# Patient Record
Sex: MALE | Race: WHITE | Employment: OTHER | URBAN - METROPOLITAN AREA
[De-identification: names, ages, dates, MRNs, and addresses within clinical notes are randomized per-mention and may not be internally consistent; named-entity substitution may affect disease eponyms.]

---

## 2017-01-19 ENCOUNTER — HOSPITAL ENCOUNTER (OUTPATIENT)
Dept: ENDOSCOPY | Age: 77
Discharge: HOME | End: 2017-01-19
Attending: INTERNAL MEDICINE | Admitting: INTERNAL MEDICINE

## 2017-01-19 VITALS
BODY MASS INDEX: 36.41 KG/M2 | WEIGHT: 232 LBS | SYSTOLIC BLOOD PRESSURE: 128 MMHG | HEART RATE: 64 BPM | TEMPERATURE: 97.3 F | HEIGHT: 67 IN | DIASTOLIC BLOOD PRESSURE: 88 MMHG | OXYGEN SATURATION: 97 % | RESPIRATION RATE: 16 BRPM

## 2017-01-19 LAB
GLUCOSE BLD-MCNC: 132 MG/DL (ref 70–99)
PERFORMED ON: ABNORMAL

## 2017-01-19 RX ORDER — MORPHINE SULFATE 2 MG/ML
2 INJECTION, SOLUTION INTRAMUSCULAR; INTRAVENOUS EVERY 5 MIN PRN
Status: DISCONTINUED | OUTPATIENT
Start: 2017-01-19 | End: 2017-01-20 | Stop reason: HOSPADM

## 2017-01-19 RX ORDER — OXYCODONE HYDROCHLORIDE AND ACETAMINOPHEN 5; 325 MG/1; MG/1
1 TABLET ORAL PRN
Status: ACTIVE | OUTPATIENT
Start: 2017-01-19 | End: 2017-01-19

## 2017-01-19 RX ORDER — PROMETHAZINE HYDROCHLORIDE 25 MG/ML
6.25 INJECTION, SOLUTION INTRAMUSCULAR; INTRAVENOUS
Status: DISCONTINUED | OUTPATIENT
Start: 2017-01-19 | End: 2017-01-20 | Stop reason: HOSPADM

## 2017-01-19 RX ORDER — LABETALOL HYDROCHLORIDE 5 MG/ML
5 INJECTION, SOLUTION INTRAVENOUS EVERY 10 MIN PRN
Status: DISCONTINUED | OUTPATIENT
Start: 2017-01-19 | End: 2017-01-20 | Stop reason: HOSPADM

## 2017-01-19 RX ORDER — ONDANSETRON 2 MG/ML
4 INJECTION INTRAMUSCULAR; INTRAVENOUS PRN
Status: DISCONTINUED | OUTPATIENT
Start: 2017-01-19 | End: 2017-01-20 | Stop reason: HOSPADM

## 2017-01-19 RX ORDER — DIPHENHYDRAMINE HYDROCHLORIDE 50 MG/ML
12.5 INJECTION INTRAMUSCULAR; INTRAVENOUS
Status: ACTIVE | OUTPATIENT
Start: 2017-01-19 | End: 2017-01-19

## 2017-01-19 RX ORDER — MEPERIDINE HYDROCHLORIDE 25 MG/ML
12.5 INJECTION INTRAMUSCULAR; INTRAVENOUS; SUBCUTANEOUS EVERY 5 MIN PRN
Status: DISCONTINUED | OUTPATIENT
Start: 2017-01-19 | End: 2017-01-20 | Stop reason: HOSPADM

## 2017-01-19 RX ORDER — HYDRALAZINE HYDROCHLORIDE 20 MG/ML
5 INJECTION INTRAMUSCULAR; INTRAVENOUS EVERY 10 MIN PRN
Status: DISCONTINUED | OUTPATIENT
Start: 2017-01-19 | End: 2017-01-20 | Stop reason: HOSPADM

## 2017-01-19 RX ORDER — SODIUM CHLORIDE 0.9 % (FLUSH) 0.9 %
10 SYRINGE (ML) INJECTION PRN
Status: DISCONTINUED | OUTPATIENT
Start: 2017-01-19 | End: 2017-01-20 | Stop reason: HOSPADM

## 2017-01-19 RX ORDER — SODIUM CHLORIDE 0.9 % (FLUSH) 0.9 %
10 SYRINGE (ML) INJECTION EVERY 12 HOURS SCHEDULED
Status: DISCONTINUED | OUTPATIENT
Start: 2017-01-19 | End: 2017-01-20 | Stop reason: HOSPADM

## 2017-01-19 RX ORDER — OXYCODONE HYDROCHLORIDE AND ACETAMINOPHEN 5; 325 MG/1; MG/1
2 TABLET ORAL PRN
Status: ACTIVE | OUTPATIENT
Start: 2017-01-19 | End: 2017-01-19

## 2017-01-19 RX ORDER — SODIUM CHLORIDE 9 MG/ML
INJECTION, SOLUTION INTRAVENOUS CONTINUOUS
Status: DISCONTINUED | OUTPATIENT
Start: 2017-01-19 | End: 2017-01-20 | Stop reason: HOSPADM

## 2017-01-19 RX ORDER — MORPHINE SULFATE 2 MG/ML
1 INJECTION, SOLUTION INTRAMUSCULAR; INTRAVENOUS EVERY 5 MIN PRN
Status: DISCONTINUED | OUTPATIENT
Start: 2017-01-19 | End: 2017-01-20 | Stop reason: HOSPADM

## 2017-01-19 RX ADMIN — SODIUM CHLORIDE: 9 INJECTION, SOLUTION INTRAVENOUS at 08:00

## 2017-01-19 ASSESSMENT — PAIN SCALES - GENERAL
PAINLEVEL_OUTOF10: 0
PAINLEVEL_OUTOF10: 0

## 2017-01-19 ASSESSMENT — PAIN - FUNCTIONAL ASSESSMENT
PAIN_FUNCTIONAL_ASSESSMENT: 0-10
PAIN_FUNCTIONAL_ASSESSMENT: FACES

## 2019-02-25 PROBLEM — R80.0 ISOLATED PROTEINURIA: Status: ACTIVE | Noted: 2019-02-25

## 2019-06-10 PROBLEM — N17.9 AKI (ACUTE KIDNEY INJURY) (HCC): Status: ACTIVE | Noted: 2019-06-10

## 2021-02-08 RX ORDER — VALSARTAN 80 MG/1
80 TABLET ORAL DAILY
COMMUNITY
Start: 2020-10-23 | End: 2021-02-08

## 2021-02-08 NOTE — PROGRESS NOTES
[facility] time______2/15/21 1100______        Surgery time___1200_________    Take the following medications with a sip of water:    Do not eat or drink anything after 12:00 midnight prior to your surgery. This includes water chewing gum, mints and ice chips. You may brush your teeth and gargle the morning of your surgery, but do not swallow the water     Please see your family doctor/pediatrician for a history and physical and/or concerning medications. Bring any test results/reports from your physicians office. If you are under the care of a heart doctor or specialist doctor, please be aware that you may be asked to them for clearance    You may be asked to stop blood thinners such as Coumadin, Plavix, Fragmin, Lovenox, etc., or any anti-inflammatories such as:  Aspirin, Ibuprofen, Advil, Naproxen prior to your surgery. We also ask that you stop any OTC medications such as fish oil, vitamin E, glucosamine, garlic, Multivitamins, COQ 10, etc.    We ask that you do not smoke 24 hours prior to surgery  We ask that you do not  drink any alcoholic beverages 24 hours prior to surgery     You must make arrangements for a responsible adult to take you home after your surgery. For your safety you will not be allowed to leave alone or drive yourself home. Your surgery will be cancelled if you do not have a ride home. Also for your safety, it is strongly suggested that someone stay with you the first 24 hours after your surgery. A parent or legal guardian must accompany a child scheduled for surgery and plan to stay at the hospital until the child is discharged. Please do not bring other children with you. For your comfort, please wear simple loose fitting clothing to the hospital.  Please do not bring valuables.     Do not wear any make-up or nail polish on your fingers or toes For your safety, please do not wear any jewelry or body piercing's on the day of surgery. All jewelry must be removed. If you have dentures, they will be removed before going to operating room. For your convenience, we will provide you with a container. If you wear contact lenses or glasses, they will be removed, please bring a case for them. If you have a living will and a durable power of  for healthcare, please bring in a copy. As part of our patient safety program to minimize surgical site infections, we ask you to do the following:    · Please notify your surgeon if you develop any illness between         now and the  day of your surgery. · This includes a cough, cold, fever, sore throat, nausea,         or vomiting, and diarrhea, etc.  ·  Please notify your surgeon if you experience dizziness, shortness         of breath or blurred vision between now and the time of your surgery. Do not shave your operative site 96 hours prior to surgery. For face and neck surgery, men may use an electric razor 48 hours   prior to surgery. You may shower the night before surgery or the morning of   your surgery with an antibacterial soap. You will need to bring a photo ID and insurance card    [facility] has an onsite pharmacy, would you like to utilize our pharmacy     If you will be staying overnight and use a C-pap machine, please bring   your C-pap to hospital     Our goal is to provide you with excellent care, therefore, visitors will be limited to two(2) in the room at a time so that we may focus on providing this care for you. Please contact pre-admission testing if you have any further questions. [facility] phone number:  [phone]  Please note these are generalized instructions for all surgical cases, you may be provided with more specific instructions according to your surgery.

## 2021-02-08 NOTE — FLOWSHEET NOTE
Preoperative Screening for Elective Surgery/Invasive Procedures While COVID-19 present in the community    ? Have you tested positive or have been told to self-isolate for COVID-19 like symptoms within the past 28 days? ? Do you currently have any of the following symptoms? o Fever >100.0 F or 99.9 F in immunocompromised patients? o New onset cough, shortness of breath or difficulty breathing?  o New onset sore throat, myalgia (muscle aches and pains), headache, loss of taste/smell or diarrhea? ? Have you had a potential exposure to COVID-19 within the past 14 days by:  o Close contact with a confirmed case? o Close contact with a healthcare worker,  or essential infrastructure worker (grocery store, [facility], gas station, public utilities or transportation)? o Do you reside in a congregate setting such as; skilled nursing facility, adult home, correctional facility, homeless shelter or other institutional setting?  o Have you had recent travel to a known COVID-19 hotspot? Indicate if the patient has a positive screen by answering yes to one or more of the above questions. Patients who test positive or screen positive prior to surgery or on the day of surgery should be evaluated in conjunction with the surgeon/proceduralist/anesthesiologist to determine the urgency of the procedure. no to all above. Pt. Will need RAPID day of procedure.

## 2021-02-12 ENCOUNTER — ANESTHESIA EVENT (OUTPATIENT)
Dept: ENDOSCOPY | Age: 81
End: 2021-02-12
Payer: MEDICARE

## 2021-02-15 ENCOUNTER — ANESTHESIA (OUTPATIENT)
Dept: ENDOSCOPY | Age: 81
End: 2021-02-15
Payer: MEDICARE

## 2021-02-15 ENCOUNTER — HOSPITAL ENCOUNTER (OUTPATIENT)
Age: 81
Setting detail: OUTPATIENT SURGERY
Discharge: HOME | End: 2021-02-15
Attending: INTERNAL MEDICINE | Admitting: INTERNAL MEDICINE
Payer: MEDICARE

## 2021-02-15 VITALS
RESPIRATION RATE: 18 BRPM | SYSTOLIC BLOOD PRESSURE: 126 MMHG | DIASTOLIC BLOOD PRESSURE: 74 MMHG | OXYGEN SATURATION: 96 %

## 2021-02-15 VITALS
RESPIRATION RATE: 16 BRPM | WEIGHT: 237 LBS | SYSTOLIC BLOOD PRESSURE: 152 MMHG | HEART RATE: 72 BPM | DIASTOLIC BLOOD PRESSURE: 83 MMHG | HEIGHT: 68 IN | TEMPERATURE: 97.3 F | OXYGEN SATURATION: 94 % | BODY MASS INDEX: 35.92 KG/M2

## 2021-02-15 DIAGNOSIS — K51.919 ULCERATIVE COLITIS WITH COMPLICATION, UNSPECIFIED LOCATION (HCC): ICD-10-CM

## 2021-02-15 LAB
GLUCOSE BLD-MCNC: 138 MG/DL (ref 70–99)
PERFORMED ON: ABNORMAL
SARS-COV-2, NAAT: NOT DETECTED

## 2021-02-15 PROCEDURE — 2580000003 HC RX 258: Performed by: ANESTHESIOLOGY

## 2021-02-15 PROCEDURE — U0002 COVID-19 LAB TEST NON-CDC: HCPCS

## 2021-02-15 PROCEDURE — 3609010300 HC COLONOSCOPY W/BIOPSY SINGLE/MULTIPLE: Performed by: INTERNAL MEDICINE

## 2021-02-15 PROCEDURE — 6360000002 HC RX W HCPCS: Performed by: NURSE ANESTHETIST, CERTIFIED REGISTERED

## 2021-02-15 PROCEDURE — 3700000000 HC ANESTHESIA ATTENDED CARE: Performed by: INTERNAL MEDICINE

## 2021-02-15 PROCEDURE — 2709999900 HC NON-CHARGEABLE SUPPLY: Performed by: INTERNAL MEDICINE

## 2021-02-15 PROCEDURE — 3700000001 HC ADD 15 MINUTES (ANESTHESIA): Performed by: INTERNAL MEDICINE

## 2021-02-15 PROCEDURE — 7100000011 HC PHASE II RECOVERY - ADDTL 15 MIN: Performed by: INTERNAL MEDICINE

## 2021-02-15 PROCEDURE — 88305 TISSUE EXAM BY PATHOLOGIST: CPT

## 2021-02-15 PROCEDURE — 7100000010 HC PHASE II RECOVERY - FIRST 15 MIN: Performed by: INTERNAL MEDICINE

## 2021-02-15 RX ORDER — ONDANSETRON 2 MG/ML
4 INJECTION INTRAMUSCULAR; INTRAVENOUS
Status: DISCONTINUED | OUTPATIENT
Start: 2021-02-15 | End: 2021-02-15 | Stop reason: HOSPADM

## 2021-02-15 RX ORDER — PROPOFOL 10 MG/ML
INJECTION, EMULSION INTRAVENOUS PRN
Status: DISCONTINUED | OUTPATIENT
Start: 2021-02-15 | End: 2021-02-15 | Stop reason: SDUPTHER

## 2021-02-15 RX ORDER — SODIUM CHLORIDE 0.9 % (FLUSH) 0.9 %
10 SYRINGE (ML) INJECTION EVERY 12 HOURS SCHEDULED
Status: DISCONTINUED | OUTPATIENT
Start: 2021-02-15 | End: 2021-02-15 | Stop reason: HOSPADM

## 2021-02-15 RX ORDER — SODIUM CHLORIDE 0.9 % (FLUSH) 0.9 %
10 SYRINGE (ML) INJECTION PRN
Status: DISCONTINUED | OUTPATIENT
Start: 2021-02-15 | End: 2021-02-15 | Stop reason: HOSPADM

## 2021-02-15 RX ORDER — SODIUM CHLORIDE 9 MG/ML
INJECTION, SOLUTION INTRAVENOUS CONTINUOUS
Status: DISCONTINUED | OUTPATIENT
Start: 2021-02-15 | End: 2021-02-15 | Stop reason: HOSPADM

## 2021-02-15 RX ADMIN — SODIUM CHLORIDE: 9 INJECTION, SOLUTION INTRAVENOUS at 12:39

## 2021-02-15 RX ADMIN — SODIUM CHLORIDE: 9 INJECTION, SOLUTION INTRAVENOUS at 11:26

## 2021-02-15 RX ADMIN — PROPOFOL 50 MG: 10 INJECTION, EMULSION INTRAVENOUS at 12:58

## 2021-02-15 RX ADMIN — PROPOFOL 100 MG: 10 INJECTION, EMULSION INTRAVENOUS at 12:51

## 2021-02-15 ASSESSMENT — PAIN SCALES - GENERAL
PAINLEVEL_OUTOF10: 0
PAINLEVEL_OUTOF10: 0

## 2021-02-15 ASSESSMENT — PAIN DESCRIPTION - DESCRIPTORS: DESCRIPTORS: CRAMPING

## 2021-02-15 ASSESSMENT — ENCOUNTER SYMPTOMS: SHORTNESS OF BREATH: 0

## 2021-02-15 NOTE — ANESTHESIA PRE PROCEDURE
Department of Anesthesiology  Preprocedure Note       Name:  Veronika Adrian   Age:  [de-identified] y.o.  :  1940                                          MRN:  [id number]         Date:  2/15/2021      Surgeon: Otilio Murray):  Ayla Osorio MD    Procedure: Procedure(s):  COLONOSCOPY DIAGNOSTIC    Medications prior to admission:   Prior to Admission medications    Medication Sig Start Date End Date Taking? Authorizing Provider   sulfaSALAzine (AZULFIDINE) 500 MG tablet  20  Yes Historical Provider, MD   finasteride (PROSCAR) 5 MG tablet Take 5 mg by mouth 19  Yes Historical Provider, MD   valsartan (DIOVAN) 80 MG tablet TAKE ONE TABLET BY MOUTH DAILY 19  Yes Historical Provider, MD   JANUVIA 100 MG tablet  19  Yes Historical Provider, MD Chelsea Irvin MAX SOLOSTAR 300 UNIT/ML injection pen  19  Yes Historical Provider, MD   omeprazole (PRILOSEC) 40 MG delayed release capsule  19  Yes Historical Provider, MD   aspirin (ASPIRIN EC LO-DOSE) 81 MG EC tablet Take 81 mg by mouth   Yes Historical Provider, MD   Multiple Vitamin (MULTIVITAMINS PO) Take 1 tablet by mouth Centrum Silver   Yes Historical Provider, MD   pravastatin (PRAVACHOL) 40 MG tablet Take 40 mg by mouth   Yes Historical Provider, MD       Current medications:    Current Facility-Administered Medications   Medication Dose Route Frequency Provider Last Rate Last Admin    0.9 % sodium chloride infusion   Intravenous Continuous Yimi Hanna MD        sodium chloride flush 0.9 % injection 10 mL  10 mL Intravenous 2 times per day Yimi Hanna MD        sodium chloride flush 0.9 % injection 10 mL  10 mL Intravenous PRN Yimi Hanna MD           Allergies:     Allergies   Allergen Reactions    Lisinopril Other (See Comments)     Other reaction(s): Cough    Metformin Other (See Comments) and Nausea Only    Penicillins Itching       Problem List:    Patient Active Problem List   Diagnosis Code    Isolated proteinuria R80.0  JUSTUS (acute kidney injury) ([facility] N17.9       Past Medical History:        Diagnosis Date    CAD (coronary artery disease)     Colitis     Diabetes mellitus ([facility]     Hyperlipidemia     Hypertension        Past Surgical History:        Procedure Laterality Date    COLONOSCOPY      EYE SURGERY Right     catarct    EYE SURGERY Left     cataract    JOINT REPLACEMENT Right     TONSILLECTOMY         Social History:    Social History     Tobacco Use    Smoking status: Former Smoker    Smokeless tobacco: Never Used   Substance Use Topics    Alcohol use: No                                Counseling given: Not Answered      Vital Signs (Current):   Vitals:    02/08/21 1326   Weight: 249 lb (112.9 kg)   Height: 5' 8\" (1.727 m)                                              BP Readings from Last 3 Encounters:   06/16/20 113/72   02/10/20 (!) 148/70   10/14/19 (!) 140/78       NPO Status: Time of last liquid consumption: 0630                        Time of last solid consumption: 1800                        Date of last liquid consumption: 02/15/21                        Date of last solid food consumption: 02/13/21    BMI:   Wt Readings from Last 3 Encounters:   02/08/21 249 lb (112.9 kg)   06/16/20 234 lb (106.1 kg)   02/10/20 242 lb (109.8 kg)     Body mass index is 37.86 kg/m². CBC:   Lab Results   Component Value Date    WBC 7.2 12/08/2020    RBC 4.45 12/08/2020    HGB 13.7 12/08/2020    HCT 39.7 12/08/2020    MCV 89.1 12/08/2020    RDW 14.0 12/08/2020     12/08/2020       CMP:   Lab Results   Component Value Date     12/08/2020    K 3.9 12/08/2020     12/08/2020    CO2 30 12/08/2020    BUN 10 12/08/2020    CREATININE 0.88 12/08/2020    GFRAA 91 12/08/2020    LABGLOM 79 12/08/2020    GLUCOSE 152 12/08/2020    CALCIUM 9.3 12/08/2020       POC Tests: No results for input(s): POCGLU, POCNA, POCK, POCCL, POCBUN, POCHEMO, POCHCT in the last 72 hours. Coags: No results found for: PROTIME, INR, APTT    HCG (If Applicable): No results found for: PREGTESTUR, PREGSERUM, HCG, HCGQUANT     ABGs: No results found for: PHART, PO2ART, XGP0OSA, LHU3JSQ, BEART, Y1BLNDGO     Type & Screen (If Applicable):  No results found for: LABABO, LABRH    Drug/Infectious Status (If Applicable):  No results found for: HIV, HEPCAB    COVID-19 Screening (If Applicable): No results found for: COVID19      Anesthesia Evaluation  Patient summary reviewed no history of anesthetic complications:   Airway: Mallampati: III  TM distance: >3 FB   Neck ROM: full  Mouth opening: > = 3 FB Dental:      Comment: No loose teeth    Pulmonary: breath sounds clear to auscultation      (-) COPD, asthma, shortness of breath, recent URI and sleep apnea                           Cardiovascular:    (+) hypertension:, CAD:, hyperlipidemia    (-) valvular problems/murmurs and past MI      Rhythm: regular  Rate: normal                    Neuro/Psych:      (-) seizures, neuromuscular disease, TIA, CVA, headaches and psychiatric history           GI/Hepatic/Renal:   (+) renal disease: CRI, bowel prep,      (-) GERD, PUD and hepatitis      ROS comment: Ulcerative colitis . Endo/Other:    (+) DiabetesType II DM, , : arthritis:., .                 Abdominal:           Vascular:                                      Anesthesia Plan      MAC     ASA 3       Induction: intravenous. Anesthetic plan and risks discussed with patient. Plan discussed with CRNA. This pre-anesthesia assessment may be used as a history and physical.    DOS STAFF ADDENDUM:    Pt seen and examined, chart reviewed (including anesthesia, drug and allergy history). No interval changes to history and physical examination. Anesthetic plan, risks, benefits, alternatives, and personnel involved discussed with patient. Patient verbalized an understanding and agrees to proceed.       Daniela Fontenot MD  February 15, 2021 11:11 AM        Jo Salazar MD   2/15/2021

## 2021-02-15 NOTE — PROCEDURES
[facility]  Endoscopy Note    Patient: Liang Erickson  : 1940  Acct#: [de-identified]    Procedure: Colonoscopy with biopsy    Date:  2/15/2021    Surgeon:  Delma Maldonado MD    Referring Physician:  Alexandr Hadley    Previous Colonoscopy: Yes  Date: 17  Greater than 3 years? Yes    Preoperative Diagnosis:  Ulcerative colitis    Postoperative Diagnosis:  Quiescent colitis and rectal polyp    Anesthesia:  See anesthesia note    Indications: This is a [de-identified]y.o. year old male who presents today with ulcerative colitis. Procedure: An informed consent was obtained from the patient after explanation of indications, benefits, possible risks and complications of the procedure. The patient was then taken to the endoscopy suite, placed in the left lateral decubitus position, and the above IV anesthesia was administered. A digital rectal examination was performed and revealed negative without mass, lesions or tenderness. The Olympus CFQ-180-AL video colonoscope was placed in the patient's rectum under digital direction and advanced to the cecum. The cecum was identified by characteristic anatomy and ballottment. The ileocecal valve was identified. The preparation was good. The scope was then withdrawn back through the cecum, ascending, transverse, descending and sigmoid colons. Carefull circumferential examination of the mucosa in these areas demonstrated a 3 mm polyp in the rectum that was biopsied and removed. The colon mucosa was normal with no active colitis seen. Random biopsies were taken of the right and left side of the colon. The scope was then withdrawn into the rectum and retroflexed. The retroflexed view of the anal verge and rectum demonstrates no abnormalities. The scope was straightened, the colon was decompressed and the scope was withdrawn from the patient. The patient tolerated the procedure well and was taken to the PACU in good condition.     Estimated Blood Loss:  none Impression: Quiescent colitis and rectal polyp    Recommendations:  Await pathology. Repeat colonoscopy in 3 years.     James Guevara MD   [facility]  2/15/2021

## 2021-02-15 NOTE — H&P
[facility]   Pre-operative History and Physical    Patient: Veronika Adrian  : 1940  Acct#: [de-identified]    History Obtained From: electronic medical record    HISTORY OF PRESENT ILLNESS  Procedure:Colonoscopy  Indications:Ulcerative colitis  Past Medical History:        Diagnosis Date    CAD (coronary artery disease)     Colitis     Diabetes mellitus ([facility]     Hyperlipidemia     Hypertension      Past Surgical History:        Procedure Laterality Date    COLONOSCOPY      EYE SURGERY Right     catarct    EYE SURGERY Left     cataract    JOINT REPLACEMENT Right     TONSILLECTOMY       Medications prior to admission:   Prior to Admission medications    Medication Sig Start Date End Date Taking?  Authorizing Provider   sulfaSALAzine (AZULFIDINE) 500 MG tablet  20  Yes Historical Provider, MD   finasteride (PROSCAR) 5 MG tablet Take 5 mg by mouth 19  Yes Historical Provider, MD   valsartan (DIOVAN) 80 MG tablet TAKE ONE TABLET BY MOUTH DAILY 19  Yes Historical Provider, MD   JANUVIA 100 MG tablet  19  Yes Historical Provider, MD Chelsea Irvin MAX SOLOSTAR 300 UNIT/ML injection pen  19  Yes Historical Provider, MD   omeprazole (Memory Marisela) 40 MG delayed release capsule  19  Yes Historical Provider, MD   aspirin (ASPIRIN EC LO-DOSE) 81 MG EC tablet Take 81 mg by mouth   Yes Historical Provider, MD   Multiple Vitamin (MULTIVITAMINS PO) Take 1 tablet by mouth Centrum Silver   Yes Historical Provider, MD   pravastatin (PRAVACHOL) 40 MG tablet Take 40 mg by mouth   Yes Historical Provider, MD     Allergies:   Lisinopril, Metformin, and Penicillins    Social History     Socioeconomic History    Marital status:      Spouse name: Not on file    Number of children: Not on file    Years of education: Not on file    Highest education level: Not on file   Occupational History    Not on file   Social Needs    Financial resource strain: Not on file    Food insecurity Worry: Not on file     Inability: Not on file    Transportation needs     Medical: Not on file     Non-medical: Not on file   Tobacco Use    Smoking status: Former Smoker    Smokeless tobacco: Never Used   Substance and Sexual Activity    Alcohol use: No    Drug use: No    Sexual activity: Yes     Partners: Female   Lifestyle    Physical activity     Days per week: Not on file     Minutes per session: Not on file    Stress: Not on file   Relationships    Social connections     Talks on phone: Not on file     Gets together: Not on file     Attends Evangelical service: Not on file     Active member of club or organization: Not on file     Attends meetings of clubs or organizations: Not on file     Relationship status: Not on file    Intimate partner violence     Fear of current or ex partner: Not on file     Emotionally abused: Not on file     Physically abused: Not on file     Forced sexual activity: Not on file   Other Topics Concern    Not on file   Social History Narrative    Not on file     Family History   Problem Relation Age of Onset    Atrial Fibrillation Mother     High Blood Pressure Father     Heart Disease Father          PHYSICAL EXAM:      /74   Pulse 88   Temp 97.3 °F (36.3 °C) (Temporal)   Resp 20   Ht 5' 8\" (1.727 m)   Wt 237 lb (107.5 kg)   SpO2 97%   BMI 36.04 kg/m²  I        Heart:normal    Lungs: normal    Abdomen: normal      ASA Grade:  See anesthesia note      ASSESSMENT AND PLAN:    1. Procedure options, risks and benefits reviewed with patient and expresses understanding.

## 2021-02-15 NOTE — ANESTHESIA POSTPROCEDURE EVALUATION
Department of Anesthesiology  Postprocedure Note    Patient: Robin Escudero  MRN: [id number]  YOB: 1940  Date of evaluation: 2/15/2021  Time:  1:19 PM     Procedure Summary     Date: 02/15/21 Room / Location: [address] [place] [address]    Anesthesia Start: 1243 Anesthesia Stop: 1301    Procedure: COLONOSCOPY WITH BIOPSY (N/A ) Diagnosis:       Ulcerative colitis with complication, unspecified location ([facility]      (Ulcerative colitis with complication)    Surgeons: Ana Pereira MD Responsible Provider: Teetee Thibodeaux MD    Anesthesia Type: MAC ASA Status: 3          Anesthesia Type: MAC    Dwayne Phase I: Dwayne Score: 10    Dwayne Phase II: Dwayne Score: 10    Last vitals: Reviewed and per EMR flowsheets.        Anesthesia Post Evaluation    Patient location during evaluation: PACU  Patient participation: complete - patient participated  Level of consciousness: awake and alert  Airway patency: patent  Nausea & Vomiting: no nausea and no vomiting  Complications: no  Cardiovascular status: hemodynamically stable  Respiratory status: acceptable  Hydration status: stable

## 2024-10-29 ENCOUNTER — APPOINTMENT (OUTPATIENT)
Age: 84
DRG: 286 | End: 2024-10-29
Payer: MEDICARE

## 2024-10-29 ENCOUNTER — HOSPITAL ENCOUNTER (INPATIENT)
Age: 84
LOS: 2 days | Discharge: HOME | DRG: 286 | End: 2024-10-31
Attending: EMERGENCY MEDICINE | Admitting: INTERNAL MEDICINE
Payer: MEDICARE

## 2024-10-29 DIAGNOSIS — R06.02 SOB (SHORTNESS OF BREATH): ICD-10-CM

## 2024-10-29 DIAGNOSIS — E87.6 HYPOKALEMIA: ICD-10-CM

## 2024-10-29 DIAGNOSIS — I50.40 SYSTOLIC AND DIASTOLIC CHF W/REDUCED LV FUNCTION, NYHA CLASS 4 (HCC): ICD-10-CM

## 2024-10-29 DIAGNOSIS — R07.9 CHEST PAIN, UNSPECIFIED TYPE: Primary | ICD-10-CM

## 2024-10-29 DIAGNOSIS — R07.9 CHEST PAIN: ICD-10-CM

## 2024-10-29 DIAGNOSIS — N30.00 ACUTE CYSTITIS WITHOUT HEMATURIA: ICD-10-CM

## 2024-10-29 DIAGNOSIS — I50.9 ACUTE ON CHRONIC CONGESTIVE HEART FAILURE, UNSPECIFIED HEART FAILURE TYPE (HCC): ICD-10-CM

## 2024-10-29 PROBLEM — N35.819 OTHER URETHRAL STRICTURE, MALE, UNSPECIFIED SITE: Status: ACTIVE | Noted: 2024-09-03

## 2024-10-29 PROBLEM — R35.0 FREQUENCY OF MICTURITION: Status: ACTIVE | Noted: 2024-09-16

## 2024-10-29 PROBLEM — E11.65 TYPE 2 DIABETES MELLITUS WITH HYPERGLYCEMIA (HCC): Status: ACTIVE | Noted: 2024-03-13

## 2024-10-29 PROBLEM — K21.9 GASTRO-ESOPHAGEAL REFLUX DISEASE WITHOUT ESOPHAGITIS: Status: ACTIVE | Noted: 2024-08-15

## 2024-10-29 PROBLEM — H53.2 DIPLOPIA: Status: ACTIVE | Noted: 2023-06-10

## 2024-10-29 PROBLEM — I63.9 CEREBROVASCULAR ACCIDENT (CVA) (HCC): Chronic | Status: ACTIVE | Noted: 2023-06-14

## 2024-10-29 PROBLEM — J01.90 ACUTE SINUSITIS: Status: ACTIVE | Noted: 2024-08-05

## 2024-10-29 PROBLEM — E55.9 VITAMIN D DEFICIENCY: Status: ACTIVE | Noted: 2024-09-11

## 2024-10-29 PROBLEM — I10 ESSENTIAL HYPERTENSION: Status: ACTIVE | Noted: 2024-08-15

## 2024-10-29 PROBLEM — U07.1 COVID-19: Status: ACTIVE | Noted: 2024-08-10

## 2024-10-29 PROBLEM — I50.23 ACUTE ON CHRONIC HEART FAILURE WITH REDUCED EJECTION FRACTION (HFREF, <= 40%) (HCC): Status: ACTIVE | Noted: 2024-10-29

## 2024-10-29 PROBLEM — Z91.81 HISTORY OF FALLING: Status: ACTIVE | Noted: 2024-08-15

## 2024-10-29 PROBLEM — N40.0 BENIGN PROSTATIC HYPERPLASIA WITHOUT LOWER URINARY TRACT SYMPTOMS: Status: ACTIVE | Noted: 2024-08-15

## 2024-10-29 PROBLEM — M19.90 UNSPECIFIED OSTEOARTHRITIS, UNSPECIFIED SITE: Status: ACTIVE | Noted: 2024-08-15

## 2024-10-29 PROBLEM — J43.9 EMPHYSEMA, UNSPECIFIED (HCC): Status: ACTIVE | Noted: 2024-01-11

## 2024-10-29 PROBLEM — I25.10 CORONARY ARTERY DISEASE INVOLVING NATIVE CORONARY ARTERY OF NATIVE HEART WITHOUT ANGINA PECTORIS: Status: ACTIVE | Noted: 2024-08-15

## 2024-10-29 LAB
ALBUMIN SERPL-MCNC: 3.7 G/DL (ref 3.4–5)
ALBUMIN/GLOB SERPL: 1.6 {RATIO}
ALP SERPL-CCNC: 80 U/L (ref 40–129)
ALT SERPL-CCNC: 14 U/L (ref 10–40)
ANION GAP SERPL CALCULATED.3IONS-SCNC: 7 MMOL/L (ref 3–16)
ANION GAP SERPL CALCULATED.3IONS-SCNC: 8 MMOL/L (ref 3–16)
AST SERPL-CCNC: 15 U/L (ref 15–37)
BACTERIA URNS QL MICRO: ABNORMAL
BASOPHILS # BLD: 0.01 K/UL (ref 0–0.2)
BASOPHILS NFR BLD: 0 %
BILIRUB SERPL-MCNC: <0.2 MG/DL (ref 0–1)
BILIRUB UR QL STRIP: NEGATIVE
BNP SERPL-MCNC: 1118 PG/ML (ref 0–449)
BUN SERPL-MCNC: 12 MG/DL (ref 7–20)
BUN SERPL-MCNC: 13 MG/DL (ref 7–20)
CALCIUM SERPL-MCNC: 9.5 MG/DL (ref 8.3–10.6)
CALCIUM SERPL-MCNC: 9.6 MG/DL (ref 8.3–10.6)
CHARACTER UR: ABNORMAL
CHLORIDE SERPL-SCNC: 101 MMOL/L (ref 99–110)
CHLORIDE SERPL-SCNC: 101 MMOL/L (ref 99–110)
CHOLEST SERPL-MCNC: 118 MG/DL (ref 0–199)
CLARITY UR: CLEAR
CO2 SERPL-SCNC: 29 MMOL/L (ref 21–32)
CO2 SERPL-SCNC: 29 MMOL/L (ref 21–32)
COLOR UR: YELLOW
CREAT SERPL-MCNC: 0.7 MG/DL (ref 0.8–1.3)
CREAT SERPL-MCNC: 0.8 MG/DL (ref 0.8–1.3)
CRP SERPL HS-MCNC: 1.8 MG/L (ref 0–5.1)
EKG ATRIAL RATE: 68 BPM
EKG DIAGNOSIS: NORMAL
EKG P AXIS: 64 DEGREES
EKG P-R INTERVAL: 210 MS
EKG Q-T INTERVAL: 432 MS
EKG QRS DURATION: 138 MS
EKG QTC CALCULATION (BAZETT): 459 MS
EKG R AXIS: -71 DEGREES
EKG T AXIS: -21 DEGREES
EKG VENTRICULAR RATE: 68 BPM
EOSINOPHIL # BLD: 0.28 K/UL (ref 0–0.6)
EOSINOPHILS RELATIVE PERCENT: 4 %
ERYTHROCYTE [DISTWIDTH] IN BLOOD BY AUTOMATED COUNT: 13 % (ref 12.4–15.4)
ERYTHROCYTE [SEDIMENTATION RATE] IN BLOOD BY WESTERGREN METHOD: 6 MM/HR (ref 0–20)
FLUAV AG SPEC QL: NEGATIVE
FLUBV AG SPEC QL: NEGATIVE
GFR, ESTIMATED: 88 ML/MIN/1.73M2
GFR, ESTIMATED: 90 ML/MIN/1.73M2
GLUCOSE BLD-MCNC: 155 MG/DL (ref 70–99)
GLUCOSE BLD-MCNC: 162 MG/DL (ref 70–99)
GLUCOSE BLD-MCNC: 164 MG/DL (ref 70–99)
GLUCOSE BLD-MCNC: 178 MG/DL (ref 70–99)
GLUCOSE BLD-MCNC: 203 MG/DL (ref 70–99)
GLUCOSE SERPL-MCNC: 212 MG/DL (ref 70–99)
GLUCOSE SERPL-MCNC: 229 MG/DL (ref 70–99)
GLUCOSE UR STRIP-MCNC: NEGATIVE MG/DL
HCT VFR BLD AUTO: 38.2 % (ref 40.5–52.5)
HDLC SERPL-MCNC: 42 MG/DL (ref 40–60)
HGB BLD-MCNC: 12.7 G/DL (ref 13.5–17.5)
HGB UR QL STRIP.AUTO: ABNORMAL
IMM GRANULOCYTES # BLD AUTO: 0.02 K/UL (ref 0–0.5)
IMM GRANULOCYTES NFR BLD: 0 %
KETONES UR STRIP-MCNC: NEGATIVE MG/DL
LDLC SERPL CALC-MCNC: 62 MG/DL (ref 0–99)
LEUKOCYTE ESTERASE UR QL STRIP: ABNORMAL
LYMPHOCYTES NFR BLD: 2.2 K/UL (ref 1–5.1)
LYMPHOCYTES RELATIVE PERCENT: 30 %
MAGNESIUM SERPL-MCNC: 1.9 MG/DL (ref 1.8–2.4)
MCH RBC QN AUTO: 30.4 PG (ref 26–34)
MCHC RBC AUTO-ENTMCNC: 33.2 G/DL (ref 31–36)
MCV RBC AUTO: 91.4 FL (ref 80–100)
MONOCYTES NFR BLD: 0.74 K/UL (ref 0–1.3)
MONOCYTES NFR BLD: 10 %
MUCOUS THREADS URNS QL MICRO: PRESENT
NEUTROPHILS NFR BLD: 56 %
NEUTS SEG NFR BLD: 4.05 K/UL (ref 1.7–7.7)
NITRITE UR QL STRIP: POSITIVE
PH UR STRIP: 7.5 [PH] (ref 5–8)
PLATELET # BLD AUTO: 239 K/UL (ref 135–450)
PMV BLD AUTO: 9.5 FL (ref 9.4–12.4)
POTASSIUM SERPL-SCNC: 3.7 MMOL/L (ref 3.5–5.1)
POTASSIUM SERPL-SCNC: 3.8 MMOL/L (ref 3.5–5.1)
PROT SERPL-MCNC: 6.1 G/DL (ref 6.4–8.2)
PROT UR STRIP-MCNC: NEGATIVE MG/DL
RBC # BLD AUTO: 4.18 M/UL (ref 4.2–5.9)
RBC #/AREA URNS HPF: ABNORMAL /HPF
SARS-COV-2 RDRP RESP QL NAA+PROBE: NOT DETECTED
SODIUM SERPL-SCNC: 137 MMOL/L (ref 136–145)
SODIUM SERPL-SCNC: 137 MMOL/L (ref 136–145)
SP GR UR STRIP: 1.01 (ref 1–1.03)
SPECIMEN DESCRIPTION: NORMAL
TRIGL SERPL-MCNC: 69 MG/DL (ref 0–149)
TROPONIN I SERPL HS-MCNC: 14 NG/L (ref 0–22)
TROPONIN I SERPL HS-MCNC: 16 NG/L (ref 0–22)
TSH SERPL DL<=0.05 MIU/L-ACNC: 2.51 UIU/ML (ref 0.27–4.2)
UROBILINOGEN UR STRIP-ACNC: 0.2 EU/DL (ref 0–1)
VLDLC SERPL CALC-MCNC: 14 MG/DL
WBC #/AREA URNS HPF: ABNORMAL /HPF
WBC OTHER # BLD: 7.3 K/UL (ref 4–11)

## 2024-10-29 PROCEDURE — 36415 COLL VENOUS BLD VENIPUNCTURE: CPT

## 2024-10-29 PROCEDURE — 84484 ASSAY OF TROPONIN QUANT: CPT

## 2024-10-29 PROCEDURE — 87804 INFLUENZA ASSAY W/OPTIC: CPT

## 2024-10-29 PROCEDURE — 93005 ELECTROCARDIOGRAM TRACING: CPT

## 2024-10-29 PROCEDURE — 93458 L HRT ARTERY/VENTRICLE ANGIO: CPT | Performed by: STUDENT IN AN ORGANIZED HEALTH CARE EDUCATION/TRAINING PROGRAM

## 2024-10-29 PROCEDURE — 4A023N7 MEASUREMENT OF CARDIAC SAMPLING AND PRESSURE, LEFT HEART, PERCUTANEOUS APPROACH: ICD-10-PCS | Performed by: STUDENT IN AN ORGANIZED HEALTH CARE EDUCATION/TRAINING PROGRAM

## 2024-10-29 PROCEDURE — C1894 INTRO/SHEATH, NON-LASER: HCPCS | Performed by: STUDENT IN AN ORGANIZED HEALTH CARE EDUCATION/TRAINING PROGRAM

## 2024-10-29 PROCEDURE — 71045 X-RAY EXAM CHEST 1 VIEW: CPT

## 2024-10-29 PROCEDURE — 80061 LIPID PANEL: CPT

## 2024-10-29 PROCEDURE — 6360000004 HC RX CONTRAST MEDICATION: Performed by: EMERGENCY MEDICINE

## 2024-10-29 PROCEDURE — 93010 ELECTROCARDIOGRAM REPORT: CPT | Performed by: STUDENT IN AN ORGANIZED HEALTH CARE EDUCATION/TRAINING PROGRAM

## 2024-10-29 PROCEDURE — 2500000003 HC RX 250 WO HCPCS: Performed by: STUDENT IN AN ORGANIZED HEALTH CARE EDUCATION/TRAINING PROGRAM

## 2024-10-29 PROCEDURE — 80053 COMPREHEN METABOLIC PANEL: CPT

## 2024-10-29 PROCEDURE — 2709999900 HC NON-CHARGEABLE SUPPLY: Performed by: STUDENT IN AN ORGANIZED HEALTH CARE EDUCATION/TRAINING PROGRAM

## 2024-10-29 PROCEDURE — 82962 GLUCOSE BLOOD TEST: CPT

## 2024-10-29 PROCEDURE — 6370000000 HC RX 637 (ALT 250 FOR IP): Performed by: INTERNAL MEDICINE

## 2024-10-29 PROCEDURE — 6360000002 HC RX W HCPCS: Performed by: INTERNAL MEDICINE

## 2024-10-29 PROCEDURE — 85652 RBC SED RATE AUTOMATED: CPT

## 2024-10-29 PROCEDURE — 6360000004 HC RX CONTRAST MEDICATION: Performed by: STUDENT IN AN ORGANIZED HEALTH CARE EDUCATION/TRAINING PROGRAM

## 2024-10-29 PROCEDURE — 81001 URINALYSIS AUTO W/SCOPE: CPT

## 2024-10-29 PROCEDURE — 83880 ASSAY OF NATRIURETIC PEPTIDE: CPT

## 2024-10-29 PROCEDURE — 84443 ASSAY THYROID STIM HORMONE: CPT

## 2024-10-29 PROCEDURE — B2151ZZ FLUOROSCOPY OF LEFT HEART USING LOW OSMOLAR CONTRAST: ICD-10-PCS | Performed by: STUDENT IN AN ORGANIZED HEALTH CARE EDUCATION/TRAINING PROGRAM

## 2024-10-29 PROCEDURE — 99285 EMERGENCY DEPT VISIT HI MDM: CPT

## 2024-10-29 PROCEDURE — 6360000002 HC RX W HCPCS: Performed by: NURSE PRACTITIONER

## 2024-10-29 PROCEDURE — 2580000003 HC RX 258: Performed by: INTERNAL MEDICINE

## 2024-10-29 PROCEDURE — 6360000002 HC RX W HCPCS: Performed by: STUDENT IN AN ORGANIZED HEALTH CARE EDUCATION/TRAINING PROGRAM

## 2024-10-29 PROCEDURE — 99152 MOD SED SAME PHYS/QHP 5/>YRS: CPT | Performed by: STUDENT IN AN ORGANIZED HEALTH CARE EDUCATION/TRAINING PROGRAM

## 2024-10-29 PROCEDURE — 6370000000 HC RX 637 (ALT 250 FOR IP): Performed by: EMERGENCY MEDICINE

## 2024-10-29 PROCEDURE — 80048 BASIC METABOLIC PNL TOTAL CA: CPT

## 2024-10-29 PROCEDURE — 71260 CT THORAX DX C+: CPT

## 2024-10-29 PROCEDURE — 93005 ELECTROCARDIOGRAM TRACING: CPT | Performed by: EMERGENCY MEDICINE

## 2024-10-29 PROCEDURE — C1769 GUIDE WIRE: HCPCS | Performed by: STUDENT IN AN ORGANIZED HEALTH CARE EDUCATION/TRAINING PROGRAM

## 2024-10-29 PROCEDURE — 2060000000 HC ICU INTERMEDIATE R&B

## 2024-10-29 PROCEDURE — B2111ZZ FLUOROSCOPY OF MULTIPLE CORONARY ARTERIES USING LOW OSMOLAR CONTRAST: ICD-10-PCS | Performed by: STUDENT IN AN ORGANIZED HEALTH CARE EDUCATION/TRAINING PROGRAM

## 2024-10-29 PROCEDURE — 86140 C-REACTIVE PROTEIN: CPT

## 2024-10-29 PROCEDURE — 87635 SARS-COV-2 COVID-19 AMP PRB: CPT

## 2024-10-29 PROCEDURE — 83735 ASSAY OF MAGNESIUM: CPT

## 2024-10-29 PROCEDURE — 2580000003 HC RX 258: Performed by: NURSE PRACTITIONER

## 2024-10-29 PROCEDURE — 6370000000 HC RX 637 (ALT 250 FOR IP): Performed by: NURSE PRACTITIONER

## 2024-10-29 PROCEDURE — 87086 URINE CULTURE/COLONY COUNT: CPT

## 2024-10-29 PROCEDURE — 85025 COMPLETE CBC W/AUTO DIFF WBC: CPT

## 2024-10-29 PROCEDURE — 87077 CULTURE AEROBIC IDENTIFY: CPT

## 2024-10-29 PROCEDURE — 6370000000 HC RX 637 (ALT 250 FOR IP): Performed by: STUDENT IN AN ORGANIZED HEALTH CARE EDUCATION/TRAINING PROGRAM

## 2024-10-29 RX ORDER — INSULIN GLARGINE 100 [IU]/ML
60 INJECTION, SOLUTION SUBCUTANEOUS NIGHTLY
Status: DISCONTINUED | OUTPATIENT
Start: 2024-10-29 | End: 2024-10-29 | Stop reason: SDUPTHER

## 2024-10-29 RX ORDER — FINASTERIDE 5 MG/1
5 TABLET, FILM COATED ORAL DAILY
Status: DISCONTINUED | OUTPATIENT
Start: 2024-10-29 | End: 2024-10-31 | Stop reason: HOSPADM

## 2024-10-29 RX ORDER — SODIUM CHLORIDE 9 MG/ML
INJECTION, SOLUTION INTRAVENOUS PRN
Status: DISCONTINUED | OUTPATIENT
Start: 2024-10-29 | End: 2024-10-29 | Stop reason: SDUPTHER

## 2024-10-29 RX ORDER — ACETAMINOPHEN 325 MG/1
650 TABLET ORAL EVERY 4 HOURS PRN
Status: CANCELLED | OUTPATIENT
Start: 2024-10-29

## 2024-10-29 RX ORDER — CLOTRIMAZOLE AND BETAMETHASONE DIPROPIONATE 10; .64 MG/G; MG/G
1 CREAM TOPICAL 2 TIMES DAILY
COMMUNITY
Start: 2024-07-08

## 2024-10-29 RX ORDER — SULFASALAZINE 500 MG/1
500 TABLET ORAL DAILY
Status: DISCONTINUED | OUTPATIENT
Start: 2024-10-29 | End: 2024-10-29

## 2024-10-29 RX ORDER — SULFASALAZINE 500 MG/1
1000 TABLET ORAL 4 TIMES DAILY
Status: DISCONTINUED | OUTPATIENT
Start: 2024-10-29 | End: 2024-10-29

## 2024-10-29 RX ORDER — ATORVASTATIN CALCIUM 40 MG/1
40 TABLET, FILM COATED ORAL NIGHTLY
Status: DISCONTINUED | OUTPATIENT
Start: 2024-10-29 | End: 2024-10-31 | Stop reason: HOSPADM

## 2024-10-29 RX ORDER — SULFASALAZINE 500 MG/1
1000 TABLET ORAL 4 TIMES DAILY
Status: DISCONTINUED | OUTPATIENT
Start: 2024-10-30 | End: 2024-10-31 | Stop reason: HOSPADM

## 2024-10-29 RX ORDER — IOPAMIDOL 755 MG/ML
INJECTION, SOLUTION INTRAVASCULAR PRN
Status: DISCONTINUED | OUTPATIENT
Start: 2024-10-29 | End: 2024-10-29 | Stop reason: HOSPADM

## 2024-10-29 RX ORDER — ONDANSETRON 2 MG/ML
4 INJECTION INTRAMUSCULAR; INTRAVENOUS EVERY 6 HOURS PRN
Status: DISCONTINUED | OUTPATIENT
Start: 2024-10-29 | End: 2024-10-31

## 2024-10-29 RX ORDER — SODIUM CHLORIDE 9 MG/ML
INJECTION, SOLUTION INTRAVENOUS PRN
Status: DISCONTINUED | OUTPATIENT
Start: 2024-10-29 | End: 2024-10-29 | Stop reason: HOSPADM

## 2024-10-29 RX ORDER — FUROSEMIDE 20 MG/1
40 TABLET ORAL DAILY
Status: DISCONTINUED | OUTPATIENT
Start: 2024-10-29 | End: 2024-10-31 | Stop reason: HOSPADM

## 2024-10-29 RX ORDER — HYDRALAZINE HYDROCHLORIDE 20 MG/ML
10 INJECTION INTRAMUSCULAR; INTRAVENOUS EVERY 6 HOURS PRN
Status: DISCONTINUED | OUTPATIENT
Start: 2024-10-29 | End: 2024-10-31 | Stop reason: HOSPADM

## 2024-10-29 RX ORDER — SODIUM CHLORIDE 0.9 % (FLUSH) 0.9 %
10 SYRINGE (ML) INJECTION EVERY 12 HOURS SCHEDULED
Status: DISCONTINUED | OUTPATIENT
Start: 2024-10-29 | End: 2024-10-31 | Stop reason: HOSPADM

## 2024-10-29 RX ORDER — HEPARIN SODIUM 1000 [USP'U]/ML
INJECTION, SOLUTION INTRAVENOUS; SUBCUTANEOUS PRN
Status: DISCONTINUED | OUTPATIENT
Start: 2024-10-29 | End: 2024-10-29 | Stop reason: HOSPADM

## 2024-10-29 RX ORDER — DORZOLAMIDE HCL 20 MG/ML
1 SOLUTION/ DROPS OPHTHALMIC 2 TIMES DAILY
Status: DISCONTINUED | OUTPATIENT
Start: 2024-10-29 | End: 2024-10-31 | Stop reason: HOSPADM

## 2024-10-29 RX ORDER — ATORVASTATIN CALCIUM 40 MG/1
40 TABLET, FILM COATED ORAL EVERY EVENING
COMMUNITY
Start: 2024-06-11

## 2024-10-29 RX ORDER — FUROSEMIDE 10 MG/ML
40 INJECTION INTRAMUSCULAR; INTRAVENOUS 2 TIMES DAILY
Status: DISCONTINUED | OUTPATIENT
Start: 2024-10-30 | End: 2024-10-29

## 2024-10-29 RX ORDER — IOPAMIDOL 755 MG/ML
75 INJECTION, SOLUTION INTRAVASCULAR
Status: COMPLETED | OUTPATIENT
Start: 2024-10-29 | End: 2024-10-29

## 2024-10-29 RX ORDER — INSULIN GLARGINE 100 [IU]/ML
10 INJECTION, SOLUTION SUBCUTANEOUS NIGHTLY
Status: DISCONTINUED | OUTPATIENT
Start: 2024-10-29 | End: 2024-10-31 | Stop reason: HOSPADM

## 2024-10-29 RX ORDER — SODIUM CHLORIDE 0.9 % (FLUSH) 0.9 %
5-40 SYRINGE (ML) INJECTION PRN
Status: CANCELLED | OUTPATIENT
Start: 2024-10-29

## 2024-10-29 RX ORDER — METOPROLOL SUCCINATE 25 MG/1
25 TABLET, EXTENDED RELEASE ORAL DAILY
Status: DISCONTINUED | OUTPATIENT
Start: 2024-10-29 | End: 2024-10-31 | Stop reason: HOSPADM

## 2024-10-29 RX ORDER — SODIUM CHLORIDE 0.9 % (FLUSH) 0.9 %
5-40 SYRINGE (ML) INJECTION PRN
Status: DISCONTINUED | OUTPATIENT
Start: 2024-10-29 | End: 2024-10-29 | Stop reason: SDUPTHER

## 2024-10-29 RX ORDER — POTASSIUM CHLORIDE 7.45 MG/ML
10 INJECTION INTRAVENOUS PRN
Status: DISCONTINUED | OUTPATIENT
Start: 2024-10-29 | End: 2024-10-31 | Stop reason: HOSPADM

## 2024-10-29 RX ORDER — ONDANSETRON 4 MG/1
4 TABLET, ORALLY DISINTEGRATING ORAL EVERY 8 HOURS PRN
Status: DISCONTINUED | OUTPATIENT
Start: 2024-10-29 | End: 2024-10-31

## 2024-10-29 RX ORDER — SODIUM CHLORIDE 0.9 % (FLUSH) 0.9 %
5-40 SYRINGE (ML) INJECTION EVERY 12 HOURS SCHEDULED
Status: DISCONTINUED | OUTPATIENT
Start: 2024-10-29 | End: 2024-10-29 | Stop reason: SDUPTHER

## 2024-10-29 RX ORDER — METOPROLOL TARTRATE 25 MG/1
12.5 TABLET, FILM COATED ORAL 2 TIMES DAILY
Status: DISCONTINUED | OUTPATIENT
Start: 2024-10-29 | End: 2024-10-29

## 2024-10-29 RX ORDER — SODIUM CHLORIDE 0.9 % (FLUSH) 0.9 %
5-40 SYRINGE (ML) INJECTION EVERY 12 HOURS SCHEDULED
Status: CANCELLED | OUTPATIENT
Start: 2024-10-29

## 2024-10-29 RX ORDER — ASPIRIN 325 MG
325 TABLET ORAL DAILY
Status: ON HOLD | COMMUNITY
End: 2024-10-31 | Stop reason: HOSPADM

## 2024-10-29 RX ORDER — POTASSIUM CHLORIDE 1500 MG/1
40 TABLET, EXTENDED RELEASE ORAL PRN
Status: DISCONTINUED | OUTPATIENT
Start: 2024-10-29 | End: 2024-10-31 | Stop reason: HOSPADM

## 2024-10-29 RX ORDER — NITROFURANTOIN 25; 75 MG/1; MG/1
100 CAPSULE ORAL 2 TIMES DAILY
Status: ON HOLD | COMMUNITY
End: 2024-10-29

## 2024-10-29 RX ORDER — METOPROLOL TARTRATE 25 MG/1
25 TABLET, FILM COATED ORAL 2 TIMES DAILY
Status: DISCONTINUED | OUTPATIENT
Start: 2024-10-29 | End: 2024-10-29

## 2024-10-29 RX ORDER — ASPIRIN 81 MG/1
81 TABLET ORAL DAILY
Status: DISCONTINUED | OUTPATIENT
Start: 2024-10-29 | End: 2024-10-31 | Stop reason: HOSPADM

## 2024-10-29 RX ORDER — FUROSEMIDE 10 MG/ML
40 INJECTION INTRAMUSCULAR; INTRAVENOUS ONCE
Status: DISCONTINUED | OUTPATIENT
Start: 2024-10-29 | End: 2024-10-29

## 2024-10-29 RX ORDER — SODIUM CHLORIDE 0.9 % (FLUSH) 0.9 %
10 SYRINGE (ML) INJECTION PRN
Status: DISCONTINUED | OUTPATIENT
Start: 2024-10-29 | End: 2024-10-31 | Stop reason: HOSPADM

## 2024-10-29 RX ORDER — SENNOSIDES A AND B 8.6 MG/1
1 TABLET, FILM COATED ORAL DAILY PRN
Status: DISCONTINUED | OUTPATIENT
Start: 2024-10-29 | End: 2024-10-31 | Stop reason: HOSPADM

## 2024-10-29 RX ORDER — VERAPAMIL HYDROCHLORIDE 2.5 MG/ML
INJECTION, SOLUTION INTRAVENOUS PRN
Status: DISCONTINUED | OUTPATIENT
Start: 2024-10-29 | End: 2024-10-29 | Stop reason: HOSPADM

## 2024-10-29 RX ORDER — TIMOLOL MALEATE 5 MG/ML
1 SOLUTION/ DROPS OPHTHALMIC 2 TIMES DAILY
Status: DISCONTINUED | OUTPATIENT
Start: 2024-10-29 | End: 2024-10-31 | Stop reason: HOSPADM

## 2024-10-29 RX ORDER — PREDNISOLONE ACETATE 10 MG/ML
1 SUSPENSION/ DROPS OPHTHALMIC DAILY
COMMUNITY

## 2024-10-29 RX ORDER — LIDOCAINE HYDROCHLORIDE 10 MG/ML
INJECTION, SOLUTION INFILTRATION; PERINEURAL PRN
Status: DISCONTINUED | OUTPATIENT
Start: 2024-10-29 | End: 2024-10-29 | Stop reason: HOSPADM

## 2024-10-29 RX ORDER — SPIRONOLACTONE 25 MG/1
25 TABLET ORAL DAILY
Status: DISCONTINUED | OUTPATIENT
Start: 2024-10-29 | End: 2024-10-31 | Stop reason: HOSPADM

## 2024-10-29 RX ORDER — KETOROLAC TROMETHAMINE 5 MG/ML
1 SOLUTION OPHTHALMIC DAILY
COMMUNITY

## 2024-10-29 RX ORDER — FUROSEMIDE 10 MG/ML
20 INJECTION INTRAMUSCULAR; INTRAVENOUS ONCE
Status: COMPLETED | OUTPATIENT
Start: 2024-10-29 | End: 2024-10-29

## 2024-10-29 RX ORDER — NITROGLYCERIN 20 MG/100ML
INJECTION INTRAVENOUS PRN
Status: DISCONTINUED | OUTPATIENT
Start: 2024-10-29 | End: 2024-10-29 | Stop reason: HOSPADM

## 2024-10-29 RX ORDER — GLUCAGON 1 MG/ML
1 KIT INJECTION PRN
Status: DISCONTINUED | OUTPATIENT
Start: 2024-10-29 | End: 2024-10-31 | Stop reason: HOSPADM

## 2024-10-29 RX ORDER — POTASSIUM CHLORIDE 1500 MG/1
20 TABLET, EXTENDED RELEASE ORAL DAILY
Status: DISCONTINUED | OUTPATIENT
Start: 2024-10-29 | End: 2024-10-29

## 2024-10-29 RX ORDER — DORZOLAMIDE HYDROCHLORIDE AND TIMOLOL MALEATE 20; 5 MG/ML; MG/ML
2 SOLUTION/ DROPS OPHTHALMIC 2 TIMES DAILY
Status: DISCONTINUED | OUTPATIENT
Start: 2024-10-29 | End: 2024-10-29 | Stop reason: SDUPTHER

## 2024-10-29 RX ORDER — SODIUM CHLORIDE 9 MG/ML
INJECTION, SOLUTION INTRAVENOUS PRN
Status: CANCELLED | OUTPATIENT
Start: 2024-10-29

## 2024-10-29 RX ORDER — FUROSEMIDE 10 MG/ML
20 INJECTION INTRAMUSCULAR; INTRAVENOUS ONCE
Status: DISCONTINUED | OUTPATIENT
Start: 2024-10-29 | End: 2024-10-29

## 2024-10-29 RX ORDER — DEXTROSE MONOHYDRATE 100 MG/ML
INJECTION, SOLUTION INTRAVENOUS CONTINUOUS PRN
Status: DISCONTINUED | OUTPATIENT
Start: 2024-10-29 | End: 2024-10-31 | Stop reason: HOSPADM

## 2024-10-29 RX ORDER — NYSTATIN 100000 [USP'U]/G
1 POWDER TOPICAL 2 TIMES DAILY
Status: ON HOLD | COMMUNITY
Start: 2024-07-01 | End: 2024-10-29 | Stop reason: ALTCHOICE

## 2024-10-29 RX ORDER — ACETAMINOPHEN 325 MG/1
650 TABLET ORAL EVERY 6 HOURS PRN
Status: DISCONTINUED | OUTPATIENT
Start: 2024-10-29 | End: 2024-10-31 | Stop reason: HOSPADM

## 2024-10-29 RX ORDER — METOPROLOL TARTRATE 25 MG/1
25 TABLET, FILM COATED ORAL ONCE
Status: COMPLETED | OUTPATIENT
Start: 2024-10-29 | End: 2024-10-29

## 2024-10-29 RX ORDER — SOLIFENACIN SUCCINATE 5 MG/1
5 TABLET, FILM COATED ORAL DAILY
COMMUNITY

## 2024-10-29 RX ORDER — PRAVASTATIN SODIUM 20 MG
40 TABLET ORAL NIGHTLY
Status: DISCONTINUED | OUTPATIENT
Start: 2024-10-29 | End: 2024-10-29

## 2024-10-29 RX ORDER — INSULIN LISPRO 100 [IU]/ML
0-8 INJECTION, SOLUTION INTRAVENOUS; SUBCUTANEOUS
Status: DISCONTINUED | OUTPATIENT
Start: 2024-10-29 | End: 2024-10-31 | Stop reason: HOSPADM

## 2024-10-29 RX ORDER — FUROSEMIDE 20 MG/1
20 TABLET ORAL DAILY
COMMUNITY
Start: 2024-09-17

## 2024-10-29 RX ORDER — POTASSIUM CHLORIDE 750 MG/1
10 TABLET, EXTENDED RELEASE ORAL DAILY
Status: ON HOLD | COMMUNITY
Start: 2024-10-24 | End: 2024-10-29

## 2024-10-29 RX ORDER — DEXTROMETHORPHAN HYDROBROMIDE AND PROMETHAZINE HYDROCHLORIDE 15; 6.25 MG/5ML; MG/5ML
5 SYRUP ORAL
Status: ON HOLD | COMMUNITY
Start: 2024-08-27 | End: 2024-10-29

## 2024-10-29 RX ORDER — ACETAMINOPHEN 650 MG/1
650 SUPPOSITORY RECTAL EVERY 6 HOURS PRN
Status: DISCONTINUED | OUTPATIENT
Start: 2024-10-29 | End: 2024-10-31 | Stop reason: HOSPADM

## 2024-10-29 RX ORDER — MIDAZOLAM HYDROCHLORIDE 1 MG/ML
INJECTION, SOLUTION INTRAMUSCULAR; INTRAVENOUS PRN
Status: DISCONTINUED | OUTPATIENT
Start: 2024-10-29 | End: 2024-10-29 | Stop reason: HOSPADM

## 2024-10-29 RX ORDER — MAGNESIUM SULFATE IN WATER 40 MG/ML
2000 INJECTION, SOLUTION INTRAVENOUS PRN
Status: DISCONTINUED | OUTPATIENT
Start: 2024-10-29 | End: 2024-10-31 | Stop reason: HOSPADM

## 2024-10-29 RX ORDER — DORZOLAMIDE HYDROCHLORIDE AND TIMOLOL MALEATE 20; 5 MG/ML; MG/ML
2 SOLUTION/ DROPS OPHTHALMIC 2 TIMES DAILY
COMMUNITY
Start: 2024-08-15

## 2024-10-29 RX ORDER — VALSARTAN 80 MG/1
80 TABLET ORAL DAILY
Status: DISCONTINUED | OUTPATIENT
Start: 2024-10-29 | End: 2024-10-29

## 2024-10-29 RX ADMIN — NITROGLYCERIN 0.5 INCH: 20 OINTMENT TOPICAL at 02:29

## 2024-10-29 RX ADMIN — POTASSIUM CHLORIDE 20 MEQ: 1500 TABLET, EXTENDED RELEASE ORAL at 08:38

## 2024-10-29 RX ADMIN — INSULIN GLARGINE 10 UNITS: 100 INJECTION, SOLUTION SUBCUTANEOUS at 21:08

## 2024-10-29 RX ADMIN — SODIUM CHLORIDE, PRESERVATIVE FREE 10 ML: 5 INJECTION INTRAVENOUS at 08:39

## 2024-10-29 RX ADMIN — METOPROLOL TARTRATE 25 MG: 25 TABLET, FILM COATED ORAL at 04:33

## 2024-10-29 RX ADMIN — INSULIN LISPRO 2 UNITS: 100 INJECTION, SOLUTION INTRAVENOUS; SUBCUTANEOUS at 04:20

## 2024-10-29 RX ADMIN — WATER 1000 MG: 1 INJECTION INTRAMUSCULAR; INTRAVENOUS; SUBCUTANEOUS at 14:49

## 2024-10-29 RX ADMIN — TIMOLOL MALEATE 1 DROP: 5 SOLUTION OPHTHALMIC at 14:49

## 2024-10-29 RX ADMIN — HYDRALAZINE HYDROCHLORIDE 10 MG: 20 INJECTION INTRAMUSCULAR; INTRAVENOUS at 21:22

## 2024-10-29 RX ADMIN — FUROSEMIDE 40 MG: 20 TABLET ORAL at 11:12

## 2024-10-29 RX ADMIN — ASPIRIN 81 MG: 81 TABLET, COATED ORAL at 08:38

## 2024-10-29 RX ADMIN — FUROSEMIDE 20 MG: 10 INJECTION, SOLUTION INTRAMUSCULAR; INTRAVENOUS at 02:23

## 2024-10-29 RX ADMIN — DORZOLAMIDE HCL 1 DROP: 20 SOLUTION/ DROPS OPHTHALMIC at 21:08

## 2024-10-29 RX ADMIN — SPIRONOLACTONE 25 MG: 25 TABLET ORAL at 11:12

## 2024-10-29 RX ADMIN — DORZOLAMIDE HCL 1 DROP: 20 SOLUTION/ DROPS OPHTHALMIC at 14:49

## 2024-10-29 RX ADMIN — IOPAMIDOL 75 ML: 755 INJECTION, SOLUTION INTRAVENOUS at 01:29

## 2024-10-29 RX ADMIN — SODIUM CHLORIDE, PRESERVATIVE FREE 10 ML: 5 INJECTION INTRAVENOUS at 21:08

## 2024-10-29 RX ADMIN — ATORVASTATIN CALCIUM 40 MG: 40 TABLET, FILM COATED ORAL at 21:08

## 2024-10-29 RX ADMIN — TIMOLOL MALEATE 1 DROP: 5 SOLUTION OPHTHALMIC at 21:08

## 2024-10-29 RX ADMIN — FINASTERIDE 5 MG: 5 TABLET, FILM COATED ORAL at 08:38

## 2024-10-29 ASSESSMENT — PAIN SCALES - GENERAL
PAINLEVEL_OUTOF10: 0

## 2024-10-29 ASSESSMENT — ENCOUNTER SYMPTOMS
GASTROINTESTINAL NEGATIVE: 1
CHEST TIGHTNESS: 1
SHORTNESS OF BREATH: 1

## 2024-10-29 ASSESSMENT — PAIN - FUNCTIONAL ASSESSMENT: PAIN_FUNCTIONAL_ASSESSMENT: NONE - DENIES PAIN

## 2024-10-29 NOTE — PLAN OF CARE
Problem: Chronic Conditions and Co-morbidities  Goal: Patient's chronic conditions and co-morbidity symptoms are monitored and maintained or improved  Outcome: Progressing     Problem: Discharge Planning  Goal: Discharge to home or other facility with appropriate resources  Outcome: Progressing     Problem: Safety - Adult  Goal: Free from fall injury  Outcome: Progressing     Problem: Pain  Goal: Verbalizes/displays adequate comfort level or baseline comfort level  Outcome: Progressing     Problem: Cardiovascular - Adult  Goal: Maintains optimal cardiac output and hemodynamic stability  Outcome: Progressing

## 2024-10-29 NOTE — ED TRIAGE NOTES
Pt arrives with c/o left sided CP that started while laying down trying to go to bed tonight, also endorses SOB. Recently had an echocardiogram and has not seen cardiologist yet.

## 2024-10-29 NOTE — ED NOTES
ED TO INPATIENT SBAR HANDOFF    Patient Name: Selina Chaney   :  1940  84 y.o.   MRN:  [id number]  Preferred Name  Selina  ED Room #:    Family/Caregiver Present no   Restraints no   Sitter no   Sepsis Risk Score      Situation  Code Status: No Order No additional code details.    Allergies: Amoxicillin-pot clavulanate, Lisinopril, Metformin, and Penicillins  Weight: Patient Vitals for the past 96 hrs (Last 3 readings):   Weight   10/29/24 0010 93.6 kg (206 lb 4.8 oz)     Arrived from: home  Chief Complaint:   Chief Complaint   Patient presents with    Chest Pain     Pt arrives with c/o left sided CP that started while laying down trying to go to bed tonight, also endorses SOB. Recently had an echocardiogram and has not seen cardiologist yet.      Hospital Problem/Diagnosis:  Principal Problem:    Acute on chronic heart failure with reduced ejection fraction (HFrEF, <= 40%) ([facility])  Resolved Problems:    * No resolved hospital problems. *    Imaging:   CT CHEST PULMONARY EMBOLISM W CONTRAST   Final Result   1.  No pulmonary emboli.   2.  Small bilateral pleural effusions with adjacent atelectasis.   3.  Coronary artery disease.      Electronically signed by WEMSkaye      XR CHEST PORTABLE   Final Result   Bilateral infrahilar atelectasis or pneumonia with small left pleural effusion.      Electronically signed by Valentino [employer]kaye        Abnormal labs:   Abnormal Labs Reviewed   CBC WITH AUTO DIFFERENTIAL - Abnormal; Notable for the following components:       Result Value    RBC 4.18 (*)     Hemoglobin 12.7 (*)     Hematocrit 38.2 (*)     All other components within normal limits   BRAIN NATRIURETIC PEPTIDE - Abnormal; Notable for the following components:    NT Pro-BNP 1,118 (*)     All other components within normal limits   COMPREHENSIVE METABOLIC PANEL W/ REFLEX TO MG FOR LOW K - Abnormal; Notable for the following components:    Glucose 229 (*)     Total Protein 6.1 (*)     All other components

## 2024-10-29 NOTE — PRE SEDATION
plan.    Medication Planned:  midazolam intravenously and fentanyl intravenously    Patient is an appropriate candidate for plan of sedation:   yes      Electronically signed by Collin Leo MD on 10/29/2024 at 8:31 AM

## 2024-10-29 NOTE — H&P
Recent Labs     10/29/24  0040   PROBNP 1,118*     No results for input(s): \"NITRU\", \"COLORU\", \"PHUR\", \"LABCAST\", \"WBCUA\", \"RBCUA\", \"MUCUS\", \"SPECGRAV\", \"LEUKOCYTESUR\", \"BLOODU\", \"GLUCOSEU\", \"KETUA\" in the last 72 hours.    CT CHEST PULMONARY EMBOLISM W CONTRAST   Final Result   1.  No pulmonary emboli.   2.  Small bilateral pleural effusions with adjacent atelectasis.   3.  Coronary artery disease.      Electronically signed by Valentino Spencer      XR CHEST PORTABLE   Final Result   Bilateral infrahilar atelectasis or pneumonia with small left pleural effusion.      Electronically signed by Valentino Spencer          ASSESSMENT / PLAN:     Acute on chronic heart failure with reduced EF  Bilateral pleural effusions   Atypical chest pain   Diabetes, type 2, with hyperglycemia  CAD  HTN     [facility] 9/2008:  multivessel disease, < 50% stenosis, no intervention at the time  ECHO 10/23/24:  LVEF 40-45%, grade I DD.  No AS.     Trop neg x2, EKG w/o ischemic changes.  CP likely pleuritic.     - trend BNP  - check lipids  - lasix 40mg IV BID  - potassium 40meq daily  - will start low dose BB  - c/w home valsartan, ASA, pravastatin, lantus  - strict I&Os  - tele monitoring  - low Na, low carb diet  - cardiology consulted      Dispo: Admit to Inpatient med/surg.  Expected LOS greater than 2 midnights.  PPx:  lovenox  PT/OT:  Not indicated   Code status:  Full Code         Mauro Underwood MD  Hospitalist

## 2024-10-29 NOTE — CARE COORDINATION
Discharge Planning Note:    Chart reviewed and it appears that patient has minimal needs for discharge at this time. Risk Score 11 %     Primary Care Physician is Andrea Pimentel DO  Primary insurance is [employer]     Please notify case management if any discharge needs are identified.      Case management will continue to follow progress and update discharge plan as needed.    Clear bilaterally, pupils equal, round and reactive to light.

## 2024-10-29 NOTE — CONSULTS
Neurologic/Psychiatric:  Normal mood and affect  Follows commands    Labs  CBC:   Lab Results   Component Value Date/Time    WBC 7.3 10/29/2024 12:12 AM    RBC 4.18 10/29/2024 12:12 AM    HGB 12.7 10/29/2024 12:12 AM    HCT 38.2 10/29/2024 12:12 AM    MCV 91.4 10/29/2024 12:12 AM    RDW 13.0 10/29/2024 12:12 AM     10/29/2024 12:12 AM     CMP:    Lab Results   Component Value Date/Time     10/29/2024 04:31 AM    K 3.7 10/29/2024 04:31 AM     10/29/2024 04:31 AM    CO2 29 10/29/2024 04:31 AM    BUN 12 10/29/2024 04:31 AM    CREATININE 0.7 10/29/2024 04:31 AM    GFRAA 88 07/06/2022 08:42 AM    LABGLOM 90 10/29/2024 04:31 AM    GLUCOSE 212 10/29/2024 04:31 AM    CALCIUM 9.5 10/29/2024 04:31 AM    BILITOT <0.2 10/29/2024 12:40 AM    ALKPHOS 80 10/29/2024 12:40 AM    AST 15 10/29/2024 12:40 AM    ALT 14 10/29/2024 12:40 AM     Lipids:    Lab Results   Component Value Date/Time    HDL 42 10/29/2024 04:31 AM    LDL 62 10/29/2024 04:31 AM     PT/INR:  No results found for: \"PTINR\"  No results found for: \"CKTOTAL\", \"CKMB\", \"CKMBINDEX\", \"TROPONINI\"    EKG:  I have reviewed EKG with the following interpretation:  Impression:  NSR RBBB LAFB    CT Chest 10/29/2024  IMPRESSION:  1.  No pulmonary emboli.  2.  Small bilateral pleural effusions with adjacent atelectasis.  3.  Coronary artery disease.    CXR 10/29/2024  Bilateral infrahilar atelectasis or pneumonia with small left pleural effusion.     Echo 10/23/2024 ([facility])  Summary:   Left ventricular wall motion is normal.   Overall left ventricular ejection fraction is estimated to be 40-45%.   Left ventricular function is mildly reduced.   Diastolic function is impaired and classified as Grade 1 (impaired   relaxation).   Mitral valve leaflets are moderately thickened in appearance.   Moderate mitral annular calcification.   Aortic valve leaflets appear sclerotic.   The study was technically limited.     Echo 6/12/2023 ([facility])  Summary:   Overall

## 2024-10-29 NOTE — ED PROVIDER NOTES
EMERGENCY MEDICINE ATTENDING NOTE  Esequiel Salazar Jr., DO, FACEP, FAAEM        CHIEF COMPLAINT  Chief Complaint   Patient presents with    Chest Pain     Pt arrives with c/o left sided CP that started while laying down trying to go to bed tonight, also endorses SOB. Recently had an echocardiogram and has not seen cardiologist yet.         HISTORY OF PRESENT ILLNESS  Selina Chaney is a 84 y.o. male who presents to the ED for evaluation of shortness of breath or chest pain.  Patient states the shortness of breath is been going on for a few days and the chest pain started tonight when he was trying to sleep.  He was laying down when he developed pressure throughout his chest mostly on the left side.  Called EMS and was given nitro with improvement.  Patient does have significant medical issues including heart disease and had an abnormal echo last week.  Denies any associated nausea or vomiting.  Did feel little bit lightheaded but no diaphoresis.  Does feel better at this time.  Denies any associated cough.    Nursing/triage notes reviewed.  No other complaints, modifying factors or associated symptoms.     REVIEW OF SYSTEMS:  All systems are reviewed and are negative unless noted in the HPI.    PAST MEDICAL HISTORY  Past Medical History:   Diagnosis Date    CAD (coronary artery disease)     Colitis     Diabetes mellitus (HCC)     Hyperlipidemia     Hypertension        SURGICAL HISTORY  Past Surgical History:   Procedure Laterality Date    COLONOSCOPY      COLONOSCOPY N/A 2/15/2021    COLONOSCOPY WITH BIOPSY performed by Francisco Guy MD at [facility] ENDOSCOPY    EYE SURGERY Right     catarct    EYE SURGERY Left     cataract    JOINT REPLACEMENT Right     TONSILLECTOMY         FAMILY HISTORY  Family History   Problem Relation Age of Onset    Atrial Fibrillation Mother     High Blood Pressure Father     Heart Disease Father        SOCIAL HISTORY  Social History     Socioeconomic History    Marital status:

## 2024-10-30 LAB
ANION GAP SERPL CALCULATED.3IONS-SCNC: 10 MMOL/L (ref 3–16)
BNP SERPL-MCNC: 1136 PG/ML (ref 0–449)
BUN SERPL-MCNC: 8 MG/DL (ref 7–20)
CALCIUM SERPL-MCNC: 9.6 MG/DL (ref 8.3–10.6)
CHLORIDE SERPL-SCNC: 104 MMOL/L (ref 99–110)
CO2 SERPL-SCNC: 26 MMOL/L (ref 21–32)
CREAT SERPL-MCNC: 0.6 MG/DL (ref 0.8–1.3)
FERRITIN SERPL-MCNC: 81 NG/ML (ref 30–400)
GFR, ESTIMATED: >90 ML/MIN/1.73M2
GLUCOSE BLD-MCNC: 145 MG/DL (ref 70–99)
GLUCOSE BLD-MCNC: 151 MG/DL (ref 70–99)
GLUCOSE BLD-MCNC: 156 MG/DL (ref 70–99)
GLUCOSE BLD-MCNC: 167 MG/DL (ref 70–99)
GLUCOSE SERPL-MCNC: 171 MG/DL (ref 70–99)
INR PPP: 1.1 (ref 0.9–1.2)
IRON SATN MFR SERPL: 20 % (ref 20–50)
IRON SERPL-MCNC: 43 UG/DL (ref 59–158)
MAGNESIUM SERPL-MCNC: 1.9 MG/DL (ref 1.8–2.4)
POTASSIUM SERPL-SCNC: 3.1 MMOL/L (ref 3.5–5.1)
POTASSIUM SERPL-SCNC: 3.5 MMOL/L (ref 3.5–5.1)
PROTHROMBIN TIME: 14.7 SEC (ref 11.9–14.9)
SODIUM SERPL-SCNC: 140 MMOL/L (ref 136–145)
TIBC SERPL-MCNC: 216 UG/DL (ref 260–445)

## 2024-10-30 PROCEDURE — 2580000003 HC RX 258: Performed by: NURSE PRACTITIONER

## 2024-10-30 PROCEDURE — 82728 ASSAY OF FERRITIN: CPT

## 2024-10-30 PROCEDURE — 6370000000 HC RX 637 (ALT 250 FOR IP): Performed by: INTERNAL MEDICINE

## 2024-10-30 PROCEDURE — 2580000003 HC RX 258: Performed by: INTERNAL MEDICINE

## 2024-10-30 PROCEDURE — 83550 IRON BINDING TEST: CPT

## 2024-10-30 PROCEDURE — 6370000000 HC RX 637 (ALT 250 FOR IP): Performed by: NURSE PRACTITIONER

## 2024-10-30 PROCEDURE — 84132 ASSAY OF SERUM POTASSIUM: CPT

## 2024-10-30 PROCEDURE — 83735 ASSAY OF MAGNESIUM: CPT

## 2024-10-30 PROCEDURE — 80048 BASIC METABOLIC PNL TOTAL CA: CPT

## 2024-10-30 PROCEDURE — 36415 COLL VENOUS BLD VENIPUNCTURE: CPT

## 2024-10-30 PROCEDURE — 83880 ASSAY OF NATRIURETIC PEPTIDE: CPT

## 2024-10-30 PROCEDURE — 82962 GLUCOSE BLOOD TEST: CPT

## 2024-10-30 PROCEDURE — 6370000000 HC RX 637 (ALT 250 FOR IP): Performed by: STUDENT IN AN ORGANIZED HEALTH CARE EDUCATION/TRAINING PROGRAM

## 2024-10-30 PROCEDURE — 6360000002 HC RX W HCPCS: Performed by: NURSE PRACTITIONER

## 2024-10-30 PROCEDURE — 85610 PROTHROMBIN TIME: CPT

## 2024-10-30 PROCEDURE — 83540 ASSAY OF IRON: CPT

## 2024-10-30 PROCEDURE — 2060000000 HC ICU INTERMEDIATE R&B

## 2024-10-30 RX ADMIN — SODIUM CHLORIDE, PRESERVATIVE FREE 10 ML: 5 INJECTION INTRAVENOUS at 20:31

## 2024-10-30 RX ADMIN — SACUBITRIL AND VALSARTAN 1 TABLET: 49; 51 TABLET, FILM COATED ORAL at 09:59

## 2024-10-30 RX ADMIN — TIMOLOL MALEATE 1 DROP: 5 SOLUTION OPHTHALMIC at 09:59

## 2024-10-30 RX ADMIN — METOPROLOL SUCCINATE 25 MG: 25 TABLET, FILM COATED, EXTENDED RELEASE ORAL at 09:59

## 2024-10-30 RX ADMIN — SACUBITRIL AND VALSARTAN 1 TABLET: 49; 51 TABLET, FILM COATED ORAL at 20:30

## 2024-10-30 RX ADMIN — ATORVASTATIN CALCIUM 40 MG: 40 TABLET, FILM COATED ORAL at 20:30

## 2024-10-30 RX ADMIN — POTASSIUM CHLORIDE 40 MEQ: 1500 TABLET, EXTENDED RELEASE ORAL at 09:59

## 2024-10-30 RX ADMIN — INSULIN GLARGINE 10 UNITS: 100 INJECTION, SOLUTION SUBCUTANEOUS at 20:31

## 2024-10-30 RX ADMIN — DORZOLAMIDE HCL 1 DROP: 20 SOLUTION/ DROPS OPHTHALMIC at 20:31

## 2024-10-30 RX ADMIN — DORZOLAMIDE HCL 1 DROP: 20 SOLUTION/ DROPS OPHTHALMIC at 09:59

## 2024-10-30 RX ADMIN — WATER 1000 MG: 1 INJECTION INTRAMUSCULAR; INTRAVENOUS; SUBCUTANEOUS at 12:19

## 2024-10-30 RX ADMIN — SULFASALAZINE 1000 MG: 500 TABLET ORAL at 20:30

## 2024-10-30 RX ADMIN — SPIRONOLACTONE 25 MG: 25 TABLET ORAL at 09:59

## 2024-10-30 RX ADMIN — FINASTERIDE 5 MG: 5 TABLET, FILM COATED ORAL at 09:59

## 2024-10-30 RX ADMIN — FUROSEMIDE 40 MG: 20 TABLET ORAL at 09:59

## 2024-10-30 RX ADMIN — SULFASALAZINE 1000 MG: 500 TABLET ORAL at 18:30

## 2024-10-30 RX ADMIN — TIMOLOL MALEATE 1 DROP: 5 SOLUTION OPHTHALMIC at 20:31

## 2024-10-30 RX ADMIN — ASPIRIN 81 MG: 81 TABLET, COATED ORAL at 09:59

## 2024-10-30 ASSESSMENT — PAIN SCALES - GENERAL
PAINLEVEL_OUTOF10: 0

## 2024-10-30 NOTE — PLAN OF CARE
Problem: Chronic Conditions and Co-morbidities  Goal: Patient's chronic conditions and co-morbidity symptoms are monitored and maintained or improved  10/29/2024 2219 by Kathy Rankin RN  Outcome: Progressing  10/29/2024 1849 by Carina Fregoso RN  Outcome: Progressing     Problem: Discharge Planning  Goal: Discharge to home or other facility with appropriate resources  10/29/2024 2219 by Kathy Rankin RN  Outcome: Progressing  10/29/2024 1849 by Carina Fregoso RN  Outcome: Progressing     Problem: Safety - Adult  Goal: Free from fall injury  10/29/2024 2219 by Kathy Rankin RN  Outcome: Progressing  10/29/2024 1849 by Carina Fregoso RN  Outcome: Progressing     Problem: Pain  Goal: Verbalizes/displays adequate comfort level or baseline comfort level  10/29/2024 2219 by Kathy Rankin RN  Outcome: Progressing  10/29/2024 1849 by Carina Fregoso RN  Outcome: Progressing     Problem: Cardiovascular - Adult  Goal: Maintains optimal cardiac output and hemodynamic stability  10/29/2024 2219 by Kathy Rankin RN  Outcome: Progressing  10/29/2024 1849 by Carina Fregoso RN  Outcome: Progressing

## 2024-10-30 NOTE — PLAN OF CARE
Problem: Chronic Conditions and Co-morbidities  Goal: Patient's chronic conditions and co-morbidity symptoms are monitored and maintained or improved  10/30/2024 1129 by Monae Arana RN  Outcome: Progressing  10/29/2024 2219 by Kathy Rankin RN  Outcome: Progressing     Problem: Discharge Planning  Goal: Discharge to home or other facility with appropriate resources  10/30/2024 1129 by Monae Arana RN  Outcome: Progressing  10/29/2024 2219 by Kathy Rankin RN  Outcome: Progressing     Problem: Safety - Adult  Goal: Free from fall injury  10/30/2024 1129 by Monae Arana RN  Outcome: Progressing  10/29/2024 2219 by Kathy Rankin RN  Outcome: Progressing     Problem: Pain  Goal: Verbalizes/displays adequate comfort level or baseline comfort level  10/30/2024 1129 by Monae Arana RN  Outcome: Progressing  10/29/2024 2219 by Kathy Rankin RN  Outcome: Progressing     Problem: Cardiovascular - Adult  Goal: Maintains optimal cardiac output and hemodynamic stability  10/30/2024 1129 by Monae Arana RN  Outcome: Progressing  10/29/2024 2219 by Kathy Rankin RN  Outcome: Progressing     Problem: Skin/Tissue Integrity  Goal: Absence of new skin breakdown  Description: 1.  Monitor for areas of redness and/or skin breakdown  2.  Assess vascular access sites hourly  3.  Every 4-6 hours minimum:  Change oxygen saturation probe site  4.  Every 4-6 hours:  If on nasal continuous positive airway pressure, respiratory therapy assess nares and determine need for appliance change or resting period.  Outcome: Progressing

## 2024-10-30 NOTE — CARE COORDINATION
Discharge Planning Note Re: Home Health Care     CM/SW noted consult for discharge planning. Chart reviewed. Noted recommendations for home health care.  CM met with patient. Introduced self and explained role of CM and discharge planning.    Patient is agreeable to home health on dc.     [place] of choice list was provided with basic dialogue that supports the patient's individualized plan of care/goals, treatment preferences and shares the quality data associated with the providers. [x] Yes [] No.  =    Referral made to [facility] - unable to accept      Referral made to [facility] [facility] [id number] [place] [address]  Phone: [phone]  Fax: [phone]          Pending [facility] order for  [x]RN []PT  []OT  []HHA  []SW  []  SLP    CM/SW will follow-up on referrals and provide any additional documentation necessary to facilitate placement.

## 2024-10-31 VITALS
OXYGEN SATURATION: 92 % | TEMPERATURE: 97.9 F | WEIGHT: 182.21 LBS | HEART RATE: 87 BPM | SYSTOLIC BLOOD PRESSURE: 143 MMHG | RESPIRATION RATE: 16 BRPM | HEIGHT: 67 IN | DIASTOLIC BLOOD PRESSURE: 67 MMHG | BODY MASS INDEX: 28.6 KG/M2

## 2024-10-31 LAB
ANION GAP SERPL CALCULATED.3IONS-SCNC: 14 MMOL/L (ref 3–16)
BASOPHILS # BLD: 0.02 K/UL (ref 0–0.2)
BASOPHILS NFR BLD: 0 %
BNP SERPL-MCNC: 723 PG/ML (ref 0–449)
BUN SERPL-MCNC: 12 MG/DL (ref 7–20)
CALCIUM SERPL-MCNC: 9.6 MG/DL (ref 8.3–10.6)
CHLORIDE SERPL-SCNC: 104 MMOL/L (ref 99–110)
CO2 SERPL-SCNC: 23 MMOL/L (ref 21–32)
CREAT SERPL-MCNC: 0.7 MG/DL (ref 0.8–1.3)
EOSINOPHIL # BLD: 0.25 K/UL (ref 0–0.6)
EOSINOPHILS RELATIVE PERCENT: 3 %
ERYTHROCYTE [DISTWIDTH] IN BLOOD BY AUTOMATED COUNT: 13 % (ref 12.4–15.4)
GFR, ESTIMATED: 90 ML/MIN/1.73M2
GLUCOSE BLD-MCNC: 133 MG/DL (ref 70–99)
GLUCOSE BLD-MCNC: 134 MG/DL (ref 70–99)
GLUCOSE BLD-MCNC: 139 MG/DL (ref 70–99)
GLUCOSE BLD-MCNC: 231 MG/DL (ref 70–99)
GLUCOSE SERPL-MCNC: 147 MG/DL (ref 70–99)
HCT VFR BLD AUTO: 46.6 % (ref 40.5–52.5)
HGB BLD-MCNC: 15.4 G/DL (ref 13.5–17.5)
IMM GRANULOCYTES # BLD AUTO: 0.02 K/UL (ref 0–0.5)
IMM GRANULOCYTES NFR BLD: 0 %
LYMPHOCYTES NFR BLD: 1.94 K/UL (ref 1–5.1)
LYMPHOCYTES RELATIVE PERCENT: 20 %
MAGNESIUM SERPL-MCNC: 1.9 MG/DL (ref 1.8–2.4)
MCH RBC QN AUTO: 29.6 PG (ref 26–34)
MCHC RBC AUTO-ENTMCNC: 33 G/DL (ref 31–36)
MCV RBC AUTO: 89.6 FL (ref 80–100)
MICROORGANISM SPEC CULT: ABNORMAL
MONOCYTES NFR BLD: 0.79 K/UL (ref 0–1.3)
MONOCYTES NFR BLD: 8 %
NEUTROPHILS NFR BLD: 68 %
NEUTS SEG NFR BLD: 6.54 K/UL (ref 1.7–7.7)
PLATELET # BLD AUTO: 265 K/UL (ref 135–450)
PMV BLD AUTO: 8.9 FL
POTASSIUM SERPL-SCNC: 3.2 MMOL/L (ref 3.5–5.1)
POTASSIUM SERPL-SCNC: 3.6 MMOL/L (ref 3.5–5.1)
RBC # BLD AUTO: 5.2 M/UL (ref 4.2–5.9)
SODIUM SERPL-SCNC: 141 MMOL/L (ref 136–145)
SPECIMEN DESCRIPTION: ABNORMAL
WBC OTHER # BLD: 9.6 K/UL (ref 4–11)

## 2024-10-31 PROCEDURE — 85025 COMPLETE CBC W/AUTO DIFF WBC: CPT

## 2024-10-31 PROCEDURE — 80048 BASIC METABOLIC PNL TOTAL CA: CPT

## 2024-10-31 PROCEDURE — 97165 OT EVAL LOW COMPLEX 30 MIN: CPT

## 2024-10-31 PROCEDURE — 2580000003 HC RX 258: Performed by: NURSE PRACTITIONER

## 2024-10-31 PROCEDURE — 82962 GLUCOSE BLOOD TEST: CPT

## 2024-10-31 PROCEDURE — 6370000000 HC RX 637 (ALT 250 FOR IP): Performed by: INTERNAL MEDICINE

## 2024-10-31 PROCEDURE — 97530 THERAPEUTIC ACTIVITIES: CPT

## 2024-10-31 PROCEDURE — 6370000000 HC RX 637 (ALT 250 FOR IP): Performed by: STUDENT IN AN ORGANIZED HEALTH CARE EDUCATION/TRAINING PROGRAM

## 2024-10-31 PROCEDURE — 2580000003 HC RX 258: Performed by: INTERNAL MEDICINE

## 2024-10-31 PROCEDURE — 6370000000 HC RX 637 (ALT 250 FOR IP): Performed by: NURSE PRACTITIONER

## 2024-10-31 PROCEDURE — 83735 ASSAY OF MAGNESIUM: CPT

## 2024-10-31 PROCEDURE — 84132 ASSAY OF SERUM POTASSIUM: CPT

## 2024-10-31 PROCEDURE — 36415 COLL VENOUS BLD VENIPUNCTURE: CPT

## 2024-10-31 PROCEDURE — 83880 ASSAY OF NATRIURETIC PEPTIDE: CPT

## 2024-10-31 PROCEDURE — 97161 PT EVAL LOW COMPLEX 20 MIN: CPT

## 2024-10-31 PROCEDURE — 93005 ELECTROCARDIOGRAM TRACING: CPT | Performed by: NURSE PRACTITIONER

## 2024-10-31 PROCEDURE — 6360000002 HC RX W HCPCS: Performed by: NURSE PRACTITIONER

## 2024-10-31 RX ORDER — SERTRALINE HYDROCHLORIDE 25 MG/1
25 TABLET, FILM COATED ORAL DAILY
Qty: 30 TABLET | Refills: 1 | Status: SHIPPED | OUTPATIENT
Start: 2024-11-01 | End: 2024-10-31 | Stop reason: HOSPADM

## 2024-10-31 RX ORDER — SERTRALINE HYDROCHLORIDE 25 MG/1
25 TABLET, FILM COATED ORAL DAILY
Status: DISCONTINUED | OUTPATIENT
Start: 2024-10-31 | End: 2024-10-31

## 2024-10-31 RX ORDER — LEVOMEFOLATE CALCIUM 7.5 MG TABLET
7.5 TABLET DAILY
Qty: 30 TABLET | Refills: 2 | Status: SHIPPED | OUTPATIENT
Start: 2024-10-31

## 2024-10-31 RX ORDER — SPIRONOLACTONE 25 MG/1
25 TABLET ORAL DAILY
Qty: 30 TABLET | Refills: 1 | Status: SHIPPED | OUTPATIENT
Start: 2024-11-01

## 2024-10-31 RX ORDER — METOPROLOL SUCCINATE 25 MG/1
25 TABLET, EXTENDED RELEASE ORAL DAILY
Qty: 30 TABLET | Refills: 1 | Status: SHIPPED | OUTPATIENT
Start: 2024-11-01

## 2024-10-31 RX ORDER — CEFDINIR 300 MG/1
300 CAPSULE ORAL 2 TIMES DAILY
Qty: 10 CAPSULE | Refills: 0 | Status: SHIPPED | OUTPATIENT
Start: 2024-10-31 | End: 2024-11-05

## 2024-10-31 RX ORDER — SERTRALINE HYDROCHLORIDE 25 MG/1
25 TABLET, FILM COATED ORAL DAILY
Status: DISCONTINUED | OUTPATIENT
Start: 2024-10-31 | End: 2024-10-31 | Stop reason: SDUPTHER

## 2024-10-31 RX ORDER — ASPIRIN 81 MG/1
81 TABLET ORAL DAILY
COMMUNITY
Start: 2024-11-01

## 2024-10-31 RX ORDER — POTASSIUM CHLORIDE 750 MG/1
10 TABLET, EXTENDED RELEASE ORAL DAILY
Qty: 60 TABLET | Refills: 3 | Status: SHIPPED | OUTPATIENT
Start: 2024-10-31

## 2024-10-31 RX ORDER — INSULIN GLARGINE 100 [IU]/ML
10 INJECTION, SOLUTION SUBCUTANEOUS NIGHTLY
Qty: 10 ML | Refills: 1 | Status: SHIPPED | OUTPATIENT
Start: 2024-10-31

## 2024-10-31 RX ADMIN — ASPIRIN 81 MG: 81 TABLET, COATED ORAL at 08:17

## 2024-10-31 RX ADMIN — SACUBITRIL AND VALSARTAN 1 TABLET: 49; 51 TABLET, FILM COATED ORAL at 08:17

## 2024-10-31 RX ADMIN — SPIRONOLACTONE 25 MG: 25 TABLET ORAL at 08:17

## 2024-10-31 RX ADMIN — FINASTERIDE 5 MG: 5 TABLET, FILM COATED ORAL at 08:17

## 2024-10-31 RX ADMIN — SULFASALAZINE 1000 MG: 500 TABLET ORAL at 18:03

## 2024-10-31 RX ADMIN — POTASSIUM CHLORIDE 40 MEQ: 1500 TABLET, EXTENDED RELEASE ORAL at 06:50

## 2024-10-31 RX ADMIN — WATER 1000 MG: 1 INJECTION INTRAMUSCULAR; INTRAVENOUS; SUBCUTANEOUS at 12:26

## 2024-10-31 RX ADMIN — SULFASALAZINE 1000 MG: 500 TABLET ORAL at 08:17

## 2024-10-31 RX ADMIN — METOPROLOL SUCCINATE 25 MG: 25 TABLET, FILM COATED, EXTENDED RELEASE ORAL at 08:17

## 2024-10-31 RX ADMIN — FUROSEMIDE 40 MG: 20 TABLET ORAL at 08:17

## 2024-10-31 RX ADMIN — SULFASALAZINE 1000 MG: 500 TABLET ORAL at 12:26

## 2024-10-31 RX ADMIN — SODIUM CHLORIDE, PRESERVATIVE FREE 10 ML: 5 INJECTION INTRAVENOUS at 08:18

## 2024-10-31 RX ADMIN — TIMOLOL MALEATE 1 DROP: 5 SOLUTION OPHTHALMIC at 08:18

## 2024-10-31 RX ADMIN — POTASSIUM CHLORIDE 20 MEQ: 1500 TABLET, EXTENDED RELEASE ORAL at 06:54

## 2024-10-31 RX ADMIN — DORZOLAMIDE HCL 1 DROP: 20 SOLUTION/ DROPS OPHTHALMIC at 08:17

## 2024-10-31 ASSESSMENT — PAIN SCALES - GENERAL
PAINLEVEL_OUTOF10: 0

## 2024-10-31 NOTE — DISCHARGE INSTR - COC
n/a  Oxygen Therapy:  is not on home oxygen therapy.  Ventilator:    - No ventilator support    Rehab Therapies: Physical Therapy and Occupational Therapy  Weight Bearing Status/Restrictions: No weight bearing restrictions  Other Medical Equipment (for information only, NOT a DME order):  cane  Other Treatments: n/a    Patient's personal belongings (please select all that are sent with patient):  None    RN SIGNATURE:  Electronically signed by Jesi Baumann RN on 10/31/24 at 7:43 PM EDT    CASE MANAGEMENT/SOCIAL WORK SECTION    Inpatient Status Date: 10/31/2024    Readmission Risk Assessment Score:  Readmission Risk              Risk of Unplanned Readmission:  16           Discharging to Facility/ Agency     Name: [facility]   Address: [address] [place] [address], [place] [address]   Phone: [phone]       / signature: Electronically signed by ALONSO Suresh on 10/31/24 at 11:34 AM EDT    PHYSICIAN SECTION    Prognosis: {Prognosis:8830567270}    Condition at Discharge: { Patient Condition:070104117}    Rehab Potential (if transferring to Rehab): {Prognosis:8677277853}    Recommended Labs or Other Treatments After Discharge: ***    Physician Certification: I certify the above information and transfer of Selina Chaney  is necessary for the continuing treatment of the diagnosis listed and that he requires {Admit to Appropriate Level of Care:96790} for {GREATER/LESS:315410222} 30 days.     Update Admission H&P: {CHP DME Changes in HandP:663524615}    PHYSICIAN SIGNATURE:  {Esignature:[id number]}

## 2024-10-31 NOTE — VIRTUAL HEALTH
Selina Chaney, was evaluated through a synchronous (real-time) audio-video encounter. The patient (and/or guardian if applicable) is aware that this is a billable service, which includes applicable co-pays. This virtual visit was conducted with patient's (and/or legal guardian's) consent. Patient identification was verified, and a caregiver was present when appropriate.  The patient was located at Facility (Appt Department): [facility]  [address] [place] [address]  The provider was located at Home (City/State): [place]   Confirm you are appropriately licensed, registered, or certified to deliver care in the state where the patient is located as indicated above. If you are not or unsure, please re-schedule the visit: Yes, I confirm.   [place] Consult to Tele-pysch Provider  Consult performed by: Lucie Gil APRN - CNP  Consult ordered by: Nelida Sheets APRN - CNP         Selina Chaney  [id number]  1940     INPATIENT TELEPSYCHIATRY EVALUATION    10/31/24    Chief Complaint:  “I had some problems with my heart”  HPI: Patient is a 84 y.o.  male who presents for psychiatric evaluation.  The patient is currently in the hospital for chest pain. The patient states this morning he had an episode of what he calls \"swimmy head.\" He states \"things just go round and round. I was thinking about death, calling out names and just wore out.\" The patients states the doctor was there when that happened. He admits to having a few episodes while in the hospital of thinking he wanted to die. The patient current denies such thoughts. He denies any previous suicide attempts, denies any current plan or intent. The patient states he is [employer] and believes suicide is a mortal sin. The patient lives at home by himself. He reports his nephew Marcus is his power of . The patient spoke with him earlier today.  On evaluation the patient is alert and oriented to

## 2024-10-31 NOTE — DISCHARGE SUMMARY
Hospital Medicine Discharge Summary    Patient ID: Selina Chaney      Patient's PCP: Andrea Pimentel DO    Admit Date: 10/29/2024     Discharge Date:   10/31/2024    Admitting Physician: No admitting provider for patient encounter.     Discharge Physician: JAZZY Medina CNP     Brief HPI and Hospital Course:   84 y.o. male with a PMHx of HTN, CAD, DM2, HFrEF, BPH, and colitis who presented for evaluation of left sided chest tightness and pressure that started earlier this evening when laying down. He has been having some associated shortness of breath for the past several days. This, too, feels worse with laying flat. He recently underwent an echocardiogram, but has yet to meet with a cardiologist. He does have an appt with one at [facility] in November.    Cardiac history:     [facility] 2008:  multivessel disease, < 50% stenosis, no intervention at the time  ECHO 10/23/24:  LVEF 40-45%, grade I DD.  No AS.   Acute on chronic heart failure reduced EF-improved  Bilateral pleural effusions-improved  Symptoms improved with diuresis  -Inpatient  -Lasix 20 mg oral daily  Resume oral potassium at discharge follow-up with lab as outpatient.  -not taking potassium as OP.  Monitor daily labs replete as needed  -Low-dose beta-blocker started on admission continue at discharge resume taking valsartan  -Cardiology consulted-appreciate assistance  Patient started on Entresto monitor for effect.  -CT chest-negative for pulmonary emboli, small bilateral pleural effusion with adjacent atelectasis.  CAD  -Patient states he has a follow-up appointment with [facility] cardiologist  -Plan for patient be discharged home with home health care services with skilled nursing for medication compliance and monitoring.  Plan for BMP in 1 week to monitor potassium.     Depression: Patient lives at home alone reports his spouse  2 years ago.  He does have family members close by he has a brother-in-law and his children.

## 2024-10-31 NOTE — PLAN OF CARE
Problem: Chronic Conditions and Co-morbidities  Goal: Patient's chronic conditions and co-morbidity symptoms are monitored and maintained or improved  10/31/2024 0808 by Jesi Baumann RN  Outcome: Progressing  10/31/2024 0016 by Kathy Rankin RN  Outcome: Progressing     Problem: Discharge Planning  Goal: Discharge to home or other facility with appropriate resources  10/31/2024 0808 by Jesi Baumann RN  Outcome: Progressing  10/31/2024 0016 by Kathy Rankin RN  Outcome: Progressing     Problem: Safety - Adult  Goal: Free from fall injury  10/31/2024 0808 by Jesi Baumann RN  Outcome: Progressing  10/31/2024 0016 by Kathy Rankin RN  Outcome: Progressing     Problem: Pain  Goal: Verbalizes/displays adequate comfort level or baseline comfort level  10/31/2024 0808 by Jesi Baumann RN  Outcome: Progressing  10/31/2024 0016 by Kathy Rankin RN  Outcome: Progressing     Problem: Cardiovascular - Adult  Goal: Maintains optimal cardiac output and hemodynamic stability  10/31/2024 0808 by Jesi Baumann RN  Outcome: Progressing  10/31/2024 0016 by Kathy Rankin RN  Outcome: Progressing     Problem: Skin/Tissue Integrity  Goal: Absence of new skin breakdown  Description: 1.  Monitor for areas of redness and/or skin breakdown  2.  Assess vascular access sites hourly  3.  Every 4-6 hours minimum:  Change oxygen saturation probe site  4.  Every 4-6 hours:  If on nasal continuous positive airway pressure, respiratory therapy assess nares and determine need for appliance change or resting period.  10/31/2024 0808 by Jesi Baumann RN  Outcome: Progressing  10/31/2024 0016 by Kathy Rankin RN  Outcome: Progressing

## 2024-10-31 NOTE — PLAN OF CARE
Problem: Chronic Conditions and Co-morbidities  Goal: Patient's chronic conditions and co-morbidity symptoms are monitored and maintained or improved  10/31/2024 0016 by Kathy Rankin RN  Outcome: Progressing  10/30/2024 1129 by Monae Arana RN  Outcome: Progressing     Problem: Discharge Planning  Goal: Discharge to home or other facility with appropriate resources  10/31/2024 0016 by Kathy Rankin RN  Outcome: Progressing  10/30/2024 1129 by Monae Arana RN  Outcome: Progressing     Problem: Safety - Adult  Goal: Free from fall injury  10/31/2024 0016 by Kathy Rankin RN  Outcome: Progressing  10/30/2024 1129 by Monae Arana RN  Outcome: Progressing     Problem: Pain  Goal: Verbalizes/displays adequate comfort level or baseline comfort level  10/31/2024 0016 by Kathy Rankin RN  Outcome: Progressing  10/30/2024 1129 by Monae Arana RN  Outcome: Progressing     Problem: Cardiovascular - Adult  Goal: Maintains optimal cardiac output and hemodynamic stability  10/31/2024 0016 by Kathy Rankin RN  Outcome: Progressing  10/30/2024 1129 by Monae Arana RN  Outcome: Progressing     Problem: Skin/Tissue Integrity  Goal: Absence of new skin breakdown  Description: 1.  Monitor for areas of redness and/or skin breakdown  2.  Assess vascular access sites hourly  3.  Every 4-6 hours minimum:  Change oxygen saturation probe site  4.  Every 4-6 hours:  If on nasal continuous positive airway pressure, respiratory therapy assess nares and determine need for appliance change or resting period.  10/31/2024 0016 by Kathy Rankin RN  Outcome: Progressing  10/30/2024 1129 by Monae Arana RN  Outcome: Progressing

## 2024-10-31 NOTE — PLAN OF CARE
Patient assessed at bedside after psychiatric eval, agreeable to starting antidepressant,  due to Qtc prolonged zoloft is not recommended,  Psych recontacted and recommended Deplin 7.5 mg,  Patient was given therapist list by social work.    Patient has support system of family, Brother in Law and his children, Nephew or sister will pick him up from hospital.  Also has a sister he can contact.   Nelida PURCELL-CNP  Internal Medicine Hospitalist   [facility] [place] [facility]  10/31/2024  4:39 PM

## 2024-11-01 LAB
EKG ATRIAL RATE: 83 BPM
EKG DIAGNOSIS: NORMAL
EKG P AXIS: 41 DEGREES
EKG P-R INTERVAL: 190 MS
EKG Q-T INTERVAL: 424 MS
EKG QRS DURATION: 132 MS
EKG QTC CALCULATION (BAZETT): 498 MS
EKG R AXIS: -77 DEGREES
EKG T AXIS: 1 DEGREES
EKG VENTRICULAR RATE: 83 BPM

## 2024-11-01 NOTE — PROGRESS NOTES
Hospitalist Progress Note      Name:  Selina Chaney /Age/Sex: 1940  (84 y.o. male)   MRN & CSN:  [id number] & [id number] Encounter Date/Time: 10/30/2024 11:16 AM EDT    Location:  3208/3208-01 PCP: Andrea Pimentel DO       Hospital Day: 2           Chief Complaint: Shortness of breath    Hospital Course: 84 y.o. male with a PMHx of HTN, CAD, DM2, HFrEF, BPH, and colitis who presented for evaluation of left sided chest tightness and pressure that started earlier this evening when laying down. He has been having some associated shortness of breath for the past several days. This, too, feels worse with laying flat. He recently underwent an echocardiogram, but has yet to meet with a cardiologist. He does have an appt with one at [facility] in November. He was given a dose of sublingual nitroglycerin by EMS with improvement.      Cardiac history:     [facility] 2008:  multivessel disease, < 50% stenosis, no intervention at the time  ECHO 10/23/24:  LVEF 40-45%, grade I DD.  No AS.    Subjective: Patient was evaluated bedside this morning.  Does not appear to be breathing better patient denies any shortness of breath at rest.  Patient reports he is feeling much better today.  Patient denies any chest pain, palpitations, no shortness of breath, no nausea vomit abdominal pain.  Denies any urinary symptoms or dysuria.  Waiting on final urine culture for outpatient antibiotic regimen.    Assessment and Recommendations:    Acute on chronic heart failure reduced EF  Bilateral pleural effusions likely secondary to to above  -Inpatient  -Lasix 40 mg IV twice daily  -not taking potassium as OP.  Monitor daily labs replete as needed  -Low-dose beta-blocker started on admission  -Patient discontinued his valsartan , reports not been taken.   -Low-sodium low-carb diet  -Cardiology consulted-appreciate assistance  Patient started on Entresto monitor for effect.  -CT chest-negative for pulmonary emboli, small bilateral 
      Hospitalist Progress Note      Name:  Selina Chaney /Age/Sex: 1940  (84 y.o. male)   MRN & CSN:  [id number] & [id number] Encounter Date/Time: 10/31/2024 11:49 AM EDT    Location:  3208/3208-01 PCP: Andrea Pimentel DO       Hospital Day: 3           Chief Complaint: Shortness of breath    Hospital Course:  84 y.o. male with a PMHx of HTN, CAD, DM2, HFrEF, BPH, and colitis who presented for evaluation of left sided chest tightness and pressure that started earlier this evening when laying down. He has been having some associated shortness of breath for the past several days. This, too, feels worse with laying flat. He recently underwent an echocardiogram, but has yet to meet with a cardiologist. He does have an appt with one at [facility] in November. He was given a dose of sublingual nitroglycerin by EMS with improvement.       Cardiac history:     [facility] 2008:  multivessel disease, < 50% stenosis, no intervention at the time  ECHO 10/23/24:  LVEF 40-45%, grade I DD.  No AS.     Subjective: Patient evaluated this morning.  He is alert and oriented sitting up in bed.  No acute distress noted.  Patient states \"I have always thoughts in my head \"he stated he was talking to God because he wants him to take it all away.  States he wants to be with his wife.  States his wife  in .  Patient denies any suicidal ideation.  He also endorses generalized weakness but states this is not new.  Patient is alert and oriented x 3, speech is clear, no neurodeficits on exam.    Initially plan patient discharged home however with new presentation prior to discharge patient evaluated by PT OT safe to discharge home alone with home health care services.  Also will have psych eval for further evaluation.      Assessment and Recommendations:    Acute on chronic heart failure reduced EF  Bilateral pleural effusions likely secondary to to above  -Inpatient  -Lasix 20 mg oral daily  Resume oral potassium at discharge 
   10/29/24 1407   Encounter Summary   Encounter Overview/Reason Initial Encounter   Service Provided For Patient and family together  (patients sister)   Referral/Consult From Rounding   Support System Family members   Last Encounter  10/29/24  (Offered SHS/no needs at this time/CK)   Begin Time 1407   End Time  1410   Total Time Calculated 3 min   Assessment/Intervention/Outcome   Assessment Calm;Coping   Intervention Active listening   Outcome Engaged in conversation;Expressed Gratitude       
  [facility] - Rehabilitation Department      Physical Therapy  3208/3208-01  [facility] 3 PROGRESSIVE CARE    [x] Initial Evaluation            [x] Daily Treatment Note         [x] Discharge Summary      Patient: Selina Chaney   : 1940   MRN: [id number]   Date of Service:  10/31/2024   Admitting Diagnosis: Acute on chronic heart failure with reduced ejection fraction (HFrEF, <= 40%) ([facility])  Ordering Physician: Nelida Sheets APRN - CNP   Current Admission Summary: Nelida Sheets APRN - CNP H&P 10/30:  \"Hospital Day: 2                                                                Chief Complaint: Shortness of breath     Hospital Course: 84 y.o. male with a PMHx of HTN, CAD, DM2, HFrEF, BPH, and colitis who presented for evaluation of left sided chest tightness and pressure that started earlier this evening when laying down. He has been having some associated shortness of breath for the past several days. This, too, feels worse with laying flat. He recently underwent an echocardiogram, but has yet to meet with a cardiologist. He does have an appt with one at [facility] in November. He was given a dose of sublingual nitroglycerin by EMS with improvement.       Cardiac history:     [facility] 2008:  multivessel disease, < 50% stenosis, no intervention at the time  ECHO 10/23/24:  LVEF 40-45%, grade I DD.  No AS.     Subjective: Patient was evaluated bedside this morning.  Does not appear to be breathing better patient denies any shortness of breath at rest.  Patient reports he is feeling much better today.  Patient denies any chest pain, palpitations, no shortness of breath, no nausea vomit abdominal pain.  Denies any urinary symptoms or dysuria.  Waiting on final urine culture for outpatient antibiotic regimen.     Assessment and Recommendations:     Acute on chronic heart failure reduced EF  Bilateral pleural effusions likely secondary to to above  -Inpatient  -Lasix 40 mg IV twice daily  -not taking 
  [facility]     Daily Progress Note      Admit Date:  10/29/2024    Chief Complaint   Patient presents with    Chest Pain     Pt arrives with c/o left sided CP that started while laying down trying to go to bed tonight, also endorses SOB. Recently had an echocardiogram and has not seen cardiologist yet.         ASSESSMENT AND PLAN:  Chest pain of uncertain etiology - likely demand ischemia from CHF and HTN  Acute on chronic systolic heart failure  Nonobstructive CAD  Hypertension    Plan  -Start Entresto (has stated allergy to ACE but not ARB and  ACE allergy is just a cough, likely ACE-related)  -Continue beta blocker, MRA at current doses  -Continue oral diuretics  -Pending respnse to above can consider adding SGLT2 as outpatient  -Check iron studies    -Pending response to starting Entresto could go home later today from a CV standpoint    -F/U as outpatient    Cardiology will sign-off at this time. Please call us if there are other issues or questions.     Subjective:  Mr. Chaney had cath yesterday, tolerated well w/o complications.  No stents were needed as disease remains unobstructive.  No chest pain or dyspnea today.    Objective:   BP (!) 150/66   Pulse 88   Temp 97.5 °F (36.4 °C) (Oral)   Resp 18   Ht 1.702 m (5' 7\")   Wt 88.3 kg (194 lb 8.9 oz)   SpO2 93%   BMI 30.47 kg/m²     Intake/Output Summary (Last 24 hours) at 10/30/2024 0732  Last data filed at 10/29/2024 2310  Gross per 24 hour   Intake 120 ml   Output 685 ml   Net -565 ml       TELEMETRY: Sinus     Physical Exam:  General:  Awake, alert, oriented x 3, NAD  Skin:  Warm and dry  Neck:  JVD none  Chest:  normal air entry  Cardiovascular:  RRR S1S2, no S3, no murmur  Abdomen:  Soft, ND, NT, No HSM  Extremities:  No edema    Medications:    aspirin  81 mg Oral Daily    finasteride  5 mg Oral Daily    sodium chloride flush  10 mL IntraVENous 2 times per day    insulin lispro  0-8 Units SubCUTAneous 4x Daily AC & HS    metoprolol 
4 Eyes Skin Assessment     NAME:  Selina Chaney  YOB: 1940  MEDICAL RECORD NUMBER:  [id number]    The patient is being assessed for  Admission    I agree that at least one RN has performed a thorough Head to Toe Skin Assessment on the patient. ALL assessment sites listed below have been assessed.      Areas assessed by both nurses:    Head, Face, Ears, Shoulders, Back, Chest, Arms, Elbows, Hands, Sacrum. Buttock, Coccyx, Ischium, Legs. Feet and Heels, and Under Medical Devices         Does the Patient have a Wound? No noted wound(s)       Yoni Prevention initiated by RN: No  Wound Care Orders initiated by RN: No    Pressure Injury (Stage 3,4, Unstageable, DTI, NWPT, and Complex wounds) if present, place Wound referral order by RN under : No    New Ostomies, if present place, Ostomy referral order under : No     Nurse 1 eSignature: Electronically signed by Lucie Auguste RN on 10/29/24 at 4:38 AM EDT    **SHARE this note so that the co-signing nurse can place an eSignature**    Nurse 2 eSignature: Electronically signed by Gisella Chairez RN on 10/29/24 at 5:35 AM EDT   
[facility]    Pharmacy Progress Note    Admit Date: 10/29/2024    Addendum 10/29/24 @ 1230: pharmacy technician spoke with patient at beside and confirmed medications. Patient reports he manages his own medications and no longer uses [facility] since 10/11/24. Medication list updated per patient. Of note, he reports he has not started his nitrofurantoin yet and has 1 tablet of ciprofloxacin 500 mg left (original prescription ciprofloxacin 500 mg twice daily for 5 days)..     List of of current medications patient is taking is complete. Home Medication list in EPIC updated to reflect changes noted below.    Source of information: Jayden and Sagar from [facility] via phone    Patient's home pharmacy: [facility] [phone]     Changes made to medication list:   Medications removed: (include reason, ex: therapy completed, patient no longer taking, etc.)  [facility]  HCTZ-pt taking furosemide instead  Januvia-pt taken off   Pravastatin-pt taking atorvastatin instead  Toujeo-pt no longer taking  Trulicity-pt no longer taking  Valsartan-pt no longer taking  KCl- pt not sure if he is taking  Promethazine/DM- pt reports never taking this medication  Medications added:   Clotrimazole/betamethasone  Nystatin powder  Cosopt  Medication doses adjusted:   Vitamin D-reported pt is only taking 1000units daily  Ketorolac opth-should be right eye  Pred-Forte opth-should be right eye  Other notes:   None    Current Outpatient Medications   Medication Instructions    aspirin 325 mg, Oral, DAILY    atorvastatin (LIPITOR) 40 mg, Oral, EVERY EVENING    clotrimazole-betamethasone (LOTRISONE) 1-0.05 % cream 1 application , Topical, 2 TIMES DAILY, Affected areas on back    dorzolamide-timolol (COSOPT) 2-0.5 % ophthalmic solution 2 drops, Left Eye, 2 TIMES DAILY    finasteride (PROSCAR) 5 mg, Oral    folic acid (FOLVITE) 1 mg, Oral, 
Following left heart cardiac catheterization, PT transported to inpatient room 3208 at this time in stable condition, via bed, by this RN and CHARLENE Sandoval. Access site is right radial artery. On handoff, band is inflated with 10 cc of air and  dressing observed to be clean, dry, and intact with no bleeding or hematoma present. Pulses are strong and equal bilaterally in the upper extremities, capillary refill is <2 seconds. See MAR for intraprocedure medications. PT reports normal sensation, denies any chest pain or shortness of breath at this time. PT is alert and oriented X4, see flowsheets for VS. PT educated on importance of keeping right arm straight and instructed to report any signs of bleeding, increased pain, swelling, or numbness to the nurse. Fall precautions in place, call light within PT's reach. Report was given to CHARLENE Lim at bedside, they state no further questions or concerns at this time.   
Patient picked up by family for discharge. Peripheral IV and Telemetry removed. All questions answered. All patient's belongings given. Electronically signed by Hemalatha Perea RN on 10/31/2024 at 8:23 PM   
Patient was admitted early this morning by overnight nocturnist.    History of present illness:     84 y.o. male with a PMHx of HTN, CAD, DM2, HFrEF, BPH, and colitis who presented for evaluation of left sided chest tightness and pressure that started earlier this evening when laying down.  He has been having some associated shortness of breath for the past several days.  This, too, feels worse with laying flat.  He recently underwent an echocardiogram, but has yet to meet with a cardiologist.  He does have an appt with one at [facility] in November.  He was given a dose of sublingual nitroglycerin by EMS with improvement.  He denies any fever, chills, dizziness, diaphoresis, palpitations, or syncope.      In the ER, pt mildly hypertensive to 160s/100s, afebrile, O2 WNL.  Labs remarkable for BG 220s and proBNP 1,118.  Troponin negative x2.  EKG showed  CTPA negative for PE, did show small bilateral effusions.     Cardiac history:    [facility] 9/2008:  multivessel disease, < 50% stenosis, no intervention at the time  ECHO 10/23/24:  LVEF 40-45%, grade I DD.  No AS.    Assessment and plan:    Acute on chronic heart failure reduced EF  Bilateral pleural effusions likely secondary to to above  -Inpatient  -Lasix 40 mg IV twice daily  -not taking potassium as OP.   -Low-dose beta-blocker started on admission  -Patient discontinued his valsartan , reports not been taken.   -Low-sodium low-carb diet  -Cardiology consulted  -CT chest-negative for pulmonary emboli, small bilateral pleural effusion with adjacent atelectasis.  CAD    Atypical chest pain: Troponin negative x 2, EKG did not show any ischemic changes.  Chest pain possibly pleuritic-cardiology consulted as above-telemetry monitoring    CAD last heart cath September 2008  -Continue aspirin and statin    Diabetes mellitus type 2 with hyperglycemia: With long-term use of insulin, home regimen include Toujeo, Januvia,  -Continue Lantus  -SSI  -ADA diet-Accu-Cheks 
Pharmacy notified that medication was not available in omnicell.  Awaiting medication to be sent via tube system.     1221 - medication never sent via tube station  
Pharmacy request Qtc check prior to starting Zoloft.  EKG ordered.   
Pt is unsure of some of his medications. Can't remember some of the names of meds.   
This RN assumed care of this patient. Bedside report given to writer by cath lab Rns. Pt VSS. Denies pain. Right Radial cath site clean dry, no drainage or bleeding, TR band in place at this time.Patient A/O x4, denies needs at this time. Nephew at bedside. Bed low, call light in hand, bed alarm on.   
seen today for occupational therapy Evaluation, Treatment, & Discharge Summary.   Pt demonstrated baseline occupational performance in fx mobility and IADLs. Pt Mod I. No concerns for returning home and no potential DME needs. Pt to return home w/ assist PRN for higher level IADLs.     Patient seen today for   [] Therex completed & addressed:  [x] Theract completed & addressed: fx mobility & t/fs  [x] ADL Training completed & addressed: LBD  [] NM re-education completed & addressed:   Pt demonstrated baseline occupational performance in fx mobility and IADLs. Pt Mod I w/ RW.    Activity Tolerance : Pt tolerated OT evaluation well.     Activities of Daily Living  Basic Activities of Daily Living  Lower Extremity Dressing: modified independent  Dressing Comments: cortney/off brief & socks  General Comments: denied other ADL needs  Instrumental Activities of Daily Living  No IADL completed on this date.    Functional Mobility  Bed Mobility:  Sit to Supine: Independent  Scooting: Independent  Comments:  Transfers:  Sit to stand transfer:modified independent  Stand to sit transfer: modified independent  Toilet transfer: modified independent  Toilet transfer comments: good balance throuhgout  Comments:  Functional Mobility  Functional Mobility Activity: to/from bathroom  Device Use: rolling walker  Required Assistance: modified independent  Comment: good balance throughout    Balance  Static Sitting:   Pt requires Independent to maintain: [x] unsupported[] with bed rail[] in chair.  Dynamic Sitting:   Pt requires Independent to maintain: [x] unsupported[] with bed rail[] in chair.  Comments:     Static Standing:  Pt requires Modified Independent to maintain: [x] with assistive device [] without assistive device.  Dynamic Standing:   Pt requires   Modified Independent to maintain: [x] with assistive device [] without assistive device.  Comments:     Functional Outcomes  AM-PAC Inpatient Daily Activity Raw Score:

## (undated) DEVICE — CATHETER ANGIO 5FR L100CM GRY S STL NYL JR4 3 SEG BRAID L

## (undated) DEVICE — ANGIOGRAPHY KIT CUSTOM LEFT HEART

## (undated) DEVICE — GUIDEWIRE VASC L260CM DIA0.035IN RAD 3MM J TIP L7CM PTFE

## (undated) DEVICE — CATH LAB PACK: Brand: MEDLINE INDUSTRIES, INC.

## (undated) DEVICE — GLIDESHEATH SLENDER ACCESS KIT: Brand: GLIDESHEATH SLENDER

## (undated) DEVICE — FORCEPS BX 240CM 2.4MM L NDL RAD JAW 4 M00513334

## (undated) DEVICE — CATHETER 5FR JL3.5 CORDIS 100CM

## (undated) DEVICE — TR BAND RADIAL ARTERY COMPRESSION DEVICE: Brand: TR BAND

## (undated) DEVICE — COVER US PRB W13XL244CM SURGICAL INTRAOPERATIVE W RUBBERBAND

## (undated) DEVICE — SOLUTION IV 0.9% NACL IRRIGATION 3000ML BAG